# Patient Record
Sex: MALE | Race: WHITE | HISPANIC OR LATINO | Employment: UNEMPLOYED | URBAN - METROPOLITAN AREA
[De-identification: names, ages, dates, MRNs, and addresses within clinical notes are randomized per-mention and may not be internally consistent; named-entity substitution may affect disease eponyms.]

---

## 2023-06-08 ENCOUNTER — OFFICE VISIT (OUTPATIENT)
Dept: INTERNAL MEDICINE CLINIC | Facility: CLINIC | Age: 68
End: 2023-06-08
Payer: COMMERCIAL

## 2023-06-08 VITALS
DIASTOLIC BLOOD PRESSURE: 80 MMHG | TEMPERATURE: 97 F | BODY MASS INDEX: 29.62 KG/M2 | OXYGEN SATURATION: 99 % | SYSTOLIC BLOOD PRESSURE: 138 MMHG | HEART RATE: 69 BPM | HEIGHT: 69 IN | WEIGHT: 200 LBS

## 2023-06-08 DIAGNOSIS — Z00.00 ANNUAL PHYSICAL EXAM: ICD-10-CM

## 2023-06-08 DIAGNOSIS — Z76.89 ENCOUNTER TO ESTABLISH CARE: Primary | ICD-10-CM

## 2023-06-08 DIAGNOSIS — Z11.59 NEED FOR HEPATITIS C SCREENING TEST: ICD-10-CM

## 2023-06-08 DIAGNOSIS — E55.9 VITAMIN D DEFICIENCY: ICD-10-CM

## 2023-06-08 DIAGNOSIS — K21.9 GASTROESOPHAGEAL REFLUX DISEASE, UNSPECIFIED WHETHER ESOPHAGITIS PRESENT: ICD-10-CM

## 2023-06-08 DIAGNOSIS — I10 PRIMARY HYPERTENSION: ICD-10-CM

## 2023-06-08 DIAGNOSIS — M54.16 LUMBAR BACK PAIN WITH RADICULOPATHY AFFECTING LEFT LOWER EXTREMITY: ICD-10-CM

## 2023-06-08 PROCEDURE — 99204 OFFICE O/P NEW MOD 45 MIN: CPT | Performed by: STUDENT IN AN ORGANIZED HEALTH CARE EDUCATION/TRAINING PROGRAM

## 2023-06-08 RX ORDER — MELOXICAM 15 MG/1
15 TABLET ORAL DAILY PRN
Qty: 90 TABLET | Refills: 1 | Status: SHIPPED | OUTPATIENT
Start: 2023-06-08 | End: 2023-07-08

## 2023-06-08 RX ORDER — AMLODIPINE BESYLATE 10 MG/1
10 TABLET ORAL DAILY
COMMUNITY

## 2023-06-08 RX ORDER — MULTIVITAMIN
1 CAPSULE ORAL DAILY
COMMUNITY

## 2023-06-08 RX ORDER — ATENOLOL AND CHLORTHALIDONE TABLET 50; 25 MG/1; MG/1
1 TABLET ORAL DAILY
COMMUNITY

## 2023-06-08 RX ORDER — METHOCARBAMOL 500 MG/1
500 TABLET, FILM COATED ORAL 4 TIMES DAILY
Qty: 21 TABLET | Refills: 0 | Status: SHIPPED | OUTPATIENT
Start: 2023-06-08 | End: 2023-06-22

## 2023-06-08 RX ORDER — OMEPRAZOLE 20 MG/1
20 CAPSULE, DELAYED RELEASE ORAL DAILY
Qty: 30 CAPSULE | Refills: 2 | Status: SHIPPED | OUTPATIENT
Start: 2023-06-08

## 2023-06-08 RX ORDER — LISINOPRIL 20 MG/1
20 TABLET ORAL DAILY
COMMUNITY

## 2023-06-08 NOTE — ASSESSMENT & PLAN NOTE
Controlled  Currently on amlodipine 10 mg, atenolol- chlorthalidone 50-25 mg daily, lisinopril 20 mg daily

## 2023-06-08 NOTE — ASSESSMENT & PLAN NOTE
For the last 2 months - worse in the last week  - Previously treated for similar presentation about 5 years ago with PT and medication  - Exacerbated by trunk movements and standing for long periods of time   - Denies paresthesia, changes in BB, fever, chills or other systemic symptoms   - NO h/o trauma   Works in housekeeping  - Trial of Meloxicam and Robaxin x 2 weeks , + MH and OTC voltaren gel  - f/u in 2 weeks

## 2023-06-08 NOTE — PROGRESS NOTES
"INTERNAL MEDICINE OFFICE VISIT NOTE  [facility] [place]    NAME: Marcos Curry  AGE: 76 y o  SEX: male    DATE OF ENCOUNTER: 6/8/2023       Chief Complaint   Patient presents with   • Establish Care     New Patient to [facility]      Previously following with a PCP in [address] [place] [address] who retired  (Dr Lolis Dupree MD - [phone])  Last visit - about a year ago   In the process of retiring  Commutes weekly to [address] [place] [address] but plans to live in the  area once he retires  C/o lower back pain with radiation down his left leg        Previously diagnosed with, but not limited to:  HTN, GERD    Denies Mental Health hx including Depression, anxiety or Other psychiatric dx     Colonoscopy previously completed    Shx: Denies Tobacco/ vaping /  Marijuana / illict drug use  Alcohol use: Sporadically- socially   Environmental exposures: [address] [employer]  Works - Maintenance/Housekeeping    - 11 years ago, relationships, Kids: 1 adult daughter     Labs or imaging reports: N/A     Review of Systems  10 point ROS negative except per HPI    OBJECTIVE:  Vitals:    06/08/23 1411   BP: 138/80   BP Location: Left arm   Patient Position: Sitting   Cuff Size: Standard   Pulse: 69   Temp: (!) 97 °F (36 1 °C)   SpO2: 99%   Weight: 90 7 kg (200 lb)   Height: 5' 9\" (1 753 m)       Physical Exam:   GENERAL: NAD, Normal appearance  Non diaphoretic, non-toxic, not ill-appearing, well-developed, well-nourished  NEUROLOGIC:  Alert/oriented x3  HEENT:  NC/AT, EOMI, MMM, no scleral icterus  CARDIAC:  RRR, +S1/S2, no S3/S4 heard, no m/g/r  PULMONARY:  non-labored breathing, CTA B/L, no wheezing/rales/rhonci appreciated at time of encounter  ABDOMEN:  Soft, NT/ND, +BS, no rebound/guarding/rigidity  Extremities:  2+ Pulses in DP/PT  No edema, cyanosis  SKIN:  No rashes or erythema    ASSESSMENT/PLAN:    1  Encounter to establish care    2  Lumbar back pain with radiculopathy affecting left lower extremity  Assessment & Plan:   For the last " 2 months - worse in the last week  - Previously treated for similar presentation about 5 years ago with PT and medication  - Exacerbated by trunk movements and standing for long periods of time   - Denies paresthesia, changes in BB, fever, chills or other systemic symptoms   - NO h/o trauma  Works in housekeeping  - Trial of Meloxicam and Robaxin x 2 weeks , + MH and OTC voltaren gel  - f/u in 2 weeks     Orders:  -     methocarbamol (ROBAXIN) 500 mg tablet; Take 1 tablet (500 mg total) by mouth 4 (four) times a day for 14 days  -     meloxicam (MOBIC) 15 mg tablet; Take 1 tablet (15 mg total) by mouth daily as needed for moderate pain DO NOT REFILL  -     omeprazole (PriLOSEC) 20 mg delayed release capsule; Take 1 capsule (20 mg total) by mouth daily    3  Primary hypertension  Assessment & Plan:  Controlled  Currently on amlodipine 10 mg, atenolol- chlorthalidone 50-25 mg daily, lisinopril 20 mg daily        4  Vitamin D deficiency  Assessment & Plan:  Currently on supplemental vitamin D  We will send for testing of vitamin D levels    Orders:  -     Vitamin D 25 hydroxy; Future    5  Annual physical exam  -     CBC; Future  -     Comprehensive metabolic panel; Future  -     TSH, 3rd generation with Free T4 reflex; Future  -     Hemoglobin A1C; Future  -     Lipid Panel With Direct LDL; Future  -     Magnesium; Future    6  Need for hepatitis C screening test  -     Hepatitis C Antibody; Future    7  Gastroesophageal reflux disease, unspecified whether esophagitis present      No current outpatient medications on file  TCM Call     None      TCM Call     None             Jordan Steiner MD  [facility] [place]    * Please Note: This note was completed in part utilizing a dictation software    Grammatical errors, random word insertions, spelling mistakes, and incomplete sentences may be an occasional consequence of this system secondary to software limitations, ambient noise, and hardware issues  If you have any questions or concerns about the content, text, or information contained within the body of this dictation, please contact the provider for clarification  **

## 2023-06-09 ENCOUNTER — APPOINTMENT (OUTPATIENT)
Dept: LAB | Facility: CLINIC | Age: 68
End: 2023-06-09
Payer: COMMERCIAL

## 2023-06-09 DIAGNOSIS — Z11.59 NEED FOR HEPATITIS C SCREENING TEST: ICD-10-CM

## 2023-06-09 DIAGNOSIS — E55.9 VITAMIN D DEFICIENCY: ICD-10-CM

## 2023-06-09 DIAGNOSIS — Z00.00 ANNUAL PHYSICAL EXAM: Primary | ICD-10-CM

## 2023-06-09 LAB
25(OH)D3 SERPL-MCNC: 20.4 NG/ML (ref 30–100)
ALBUMIN SERPL BCP-MCNC: 3.6 G/DL (ref 3.5–5)
ALP SERPL-CCNC: 72 U/L (ref 46–116)
ALT SERPL W P-5'-P-CCNC: 49 U/L (ref 12–78)
ANION GAP SERPL CALCULATED.3IONS-SCNC: 3 MMOL/L (ref 4–13)
AST SERPL W P-5'-P-CCNC: 29 U/L (ref 5–45)
BILIRUB SERPL-MCNC: 0.52 MG/DL (ref 0.2–1)
BUN SERPL-MCNC: 22 MG/DL (ref 5–25)
CALCIUM SERPL-MCNC: 9.5 MG/DL (ref 8.3–10.1)
CHLORIDE SERPL-SCNC: 105 MMOL/L (ref 96–108)
CHOLEST SERPL-MCNC: 166 MG/DL
CO2 SERPL-SCNC: 31 MMOL/L (ref 21–32)
CREAT SERPL-MCNC: 1.28 MG/DL (ref 0.6–1.3)
ERYTHROCYTE [DISTWIDTH] IN BLOOD BY AUTOMATED COUNT: 12.4 % (ref 11.6–15.1)
EST. AVERAGE GLUCOSE BLD GHB EST-MCNC: 103 MG/DL
GFR SERPL CREATININE-BSD FRML MDRD: 57 ML/MIN/1.73SQ M
GLUCOSE P FAST SERPL-MCNC: 93 MG/DL (ref 65–99)
HBA1C MFR BLD: 5.2 %
HCT VFR BLD AUTO: 42.3 % (ref 36.5–49.3)
HCV AB SER QL: NORMAL
HDLC SERPL-MCNC: 54 MG/DL
HGB BLD-MCNC: 13.3 G/DL (ref 12–17)
LDLC SERPL CALC-MCNC: 88 MG/DL (ref 0–100)
MAGNESIUM SERPL-MCNC: 2.5 MG/DL (ref 1.6–2.6)
MCH RBC QN AUTO: 29.5 PG (ref 26.8–34.3)
MCHC RBC AUTO-ENTMCNC: 31.4 G/DL (ref 31.4–37.4)
MCV RBC AUTO: 94 FL (ref 82–98)
NONHDLC SERPL-MCNC: 112 MG/DL
PLATELET # BLD AUTO: 330 THOUSANDS/UL (ref 149–390)
PMV BLD AUTO: 10.2 FL (ref 8.9–12.7)
POTASSIUM SERPL-SCNC: 3.3 MMOL/L (ref 3.5–5.3)
PROT SERPL-MCNC: 8.4 G/DL (ref 6.4–8.4)
RBC # BLD AUTO: 4.51 MILLION/UL (ref 3.88–5.62)
SODIUM SERPL-SCNC: 139 MMOL/L (ref 135–147)
TRIGL SERPL-MCNC: 118 MG/DL
TSH SERPL DL<=0.05 MIU/L-ACNC: 2.08 UIU/ML (ref 0.45–4.5)
WBC # BLD AUTO: 6.12 THOUSAND/UL (ref 4.31–10.16)

## 2023-06-09 PROCEDURE — 36415 COLL VENOUS BLD VENIPUNCTURE: CPT

## 2023-06-09 PROCEDURE — 82306 VITAMIN D 25 HYDROXY: CPT

## 2023-06-09 PROCEDURE — 80061 LIPID PANEL: CPT

## 2023-06-09 PROCEDURE — 86803 HEPATITIS C AB TEST: CPT

## 2023-06-09 PROCEDURE — 84443 ASSAY THYROID STIM HORMONE: CPT

## 2023-06-09 PROCEDURE — 85027 COMPLETE CBC AUTOMATED: CPT

## 2023-06-09 PROCEDURE — 80053 COMPREHEN METABOLIC PANEL: CPT

## 2023-06-09 PROCEDURE — 83735 ASSAY OF MAGNESIUM: CPT

## 2023-06-09 PROCEDURE — 83036 HEMOGLOBIN GLYCOSYLATED A1C: CPT

## 2023-06-19 ENCOUNTER — RA CDI HCC (OUTPATIENT)
Dept: OTHER | Facility: HOSPITAL | Age: 68
End: 2023-06-19

## 2023-06-19 NOTE — PROGRESS NOTES
[facility]  coding opportunities       Chart reviewed, no opportunity found:   [address]        Patients Insurance     Medicare Insurance: [employer]vantage

## 2023-06-23 ENCOUNTER — OFFICE VISIT (OUTPATIENT)
Dept: INTERNAL MEDICINE CLINIC | Facility: CLINIC | Age: 68
End: 2023-06-23
Payer: COMMERCIAL

## 2023-06-23 VITALS
HEART RATE: 71 BPM | BODY MASS INDEX: 30.07 KG/M2 | WEIGHT: 203 LBS | HEIGHT: 69 IN | DIASTOLIC BLOOD PRESSURE: 80 MMHG | TEMPERATURE: 97.7 F | OXYGEN SATURATION: 96 % | SYSTOLIC BLOOD PRESSURE: 138 MMHG

## 2023-06-23 DIAGNOSIS — I10 PRIMARY HYPERTENSION: ICD-10-CM

## 2023-06-23 DIAGNOSIS — Z12.11 COLON CANCER SCREENING: ICD-10-CM

## 2023-06-23 DIAGNOSIS — Z00.00 ENCOUNTER FOR ANNUAL WELLNESS EXAM IN MEDICARE PATIENT: Primary | ICD-10-CM

## 2023-06-23 DIAGNOSIS — E55.9 VITAMIN D DEFICIENCY: ICD-10-CM

## 2023-06-23 DIAGNOSIS — E87.6 HYPOKALEMIA: ICD-10-CM

## 2023-06-23 DIAGNOSIS — M54.16 LUMBAR BACK PAIN WITH RADICULOPATHY AFFECTING LEFT LOWER EXTREMITY: ICD-10-CM

## 2023-06-23 PROCEDURE — G0438 PPPS, INITIAL VISIT: HCPCS | Performed by: STUDENT IN AN ORGANIZED HEALTH CARE EDUCATION/TRAINING PROGRAM

## 2023-06-23 PROCEDURE — 99214 OFFICE O/P EST MOD 30 MIN: CPT | Performed by: STUDENT IN AN ORGANIZED HEALTH CARE EDUCATION/TRAINING PROGRAM

## 2023-06-23 RX ORDER — AMLODIPINE BESYLATE 10 MG/1
10 TABLET ORAL DAILY
Qty: 90 TABLET | Refills: 1 | Status: SHIPPED | OUTPATIENT
Start: 2023-06-23 | End: 2023-09-21

## 2023-06-23 RX ORDER — METHOCARBAMOL 500 MG/1
500 TABLET, FILM COATED ORAL 4 TIMES DAILY
Qty: 56 TABLET | Refills: 0 | Status: SHIPPED | OUTPATIENT
Start: 2023-06-23 | End: 2023-07-07

## 2023-06-23 RX ORDER — POTASSIUM CHLORIDE 20 MEQ/1
20 TABLET, EXTENDED RELEASE ORAL DAILY
Qty: 30 TABLET | Refills: 0 | Status: SHIPPED | OUTPATIENT
Start: 2023-06-23

## 2023-06-23 RX ORDER — ATENOLOL AND CHLORTHALIDONE TABLET 50; 25 MG/1; MG/1
1 TABLET ORAL DAILY
Qty: 90 TABLET | Refills: 1 | Status: SHIPPED | OUTPATIENT
Start: 2023-06-23 | End: 2023-09-21

## 2023-06-23 RX ORDER — LISINOPRIL 30 MG/1
30 TABLET ORAL DAILY
Qty: 90 TABLET | Refills: 1 | Status: SHIPPED | OUTPATIENT
Start: 2023-06-23

## 2023-06-23 RX ORDER — ERGOCALCIFEROL 1.25 MG/1
50000 CAPSULE ORAL WEEKLY
Qty: 12 CAPSULE | Refills: 0 | Status: SHIPPED | OUTPATIENT
Start: 2023-06-23

## 2023-06-23 RX ORDER — POTASSIUM CHLORIDE 20 MEQ/1
20 TABLET, EXTENDED RELEASE ORAL DAILY
Qty: 30 TABLET | Refills: 5 | Status: SHIPPED | OUTPATIENT
Start: 2023-06-23 | End: 2023-06-23

## 2023-06-23 NOTE — ASSESSMENT & PLAN NOTE
Lab Results   Component Value Date    K 3 3 (L) 06/09/2023      -We will start supplemental potassium with 20 mEq daily  -Repeat BMP in 1 week

## 2023-06-23 NOTE — ASSESSMENT & PLAN NOTE
Lab Results   Component Value Date    [id number] 20 4 (L) 06/09/2023       Will start high dose supplementation x 12 weeks followed by daily supplementation

## 2023-06-23 NOTE — ASSESSMENT & PLAN NOTE
Goal <130/80 - BP not at goal  - Will increase lisinopril to 30mg  -Continue amlodipine 10 mg atenolol chlorthalidone 50-25 mg  -We will start supplemental potassium due to hypokalemia likely due to chlorthalidone

## 2023-06-23 NOTE — PROGRESS NOTES
Assessment and Plan:     Problem List Items Addressed This Visit        Cardiovascular and Mediastinum    Primary hypertension     Goal <130/80 - BP not at goal  - Will increase lisinopril to 30mg  -Continue amlodipine 10 mg atenolol chlorthalidone 50-25 mg  -We will start supplemental potassium due to hypokalemia likely due to chlorthalidone         Relevant Medications    lisinopril (ZESTRIL) 30 mg tablet    atenolol-chlorthalidone (TENORETIC) 50-25 mg per tablet    amLODIPine (NORVASC) 10 mg tablet       Nervous and Auditory    Lumbar back pain with radiculopathy affecting left lower extremity     Improving  -We will extend trial of meloxicam and Robaxin for another 2 weeks         Relevant Medications    methocarbamol (ROBAXIN) 500 mg tablet       Other    Vitamin D deficiency     Lab Results   Component Value Date    [id number] 20 4 (L) 06/09/2023       Will start high dose supplementation x 12 weeks followed by daily supplementation           Relevant Medications    ergocalciferol (VITAMIN D2) 50,000 units    Hypokalemia     Lab Results   Component Value Date    K 3 3 (L) 06/09/2023      -We will start supplemental potassium with 20 mEq daily  -Repeat BMP in 1 week         Relevant Medications    potassium chloride (K-DUR,KLOR-CON) 20 mEq tablet    Other Relevant Orders    Basic metabolic panel   Other Visit Diagnoses     Encounter for annual wellness exam in Medicare patient    -  Primary    Colon cancer screening        Relevant Orders    Ambulatory Referral to General Surgery           Preventive health issues were discussed with patient, and age appropriate screening tests were ordered as noted in patient's After Visit Summary  Personalized health advice and appropriate referrals for health education or preventive services given if needed, as noted in patient's After Visit Summary       History of Present Illness:     Patient presents for a Medicare Wellness Visit and f/u     HPI   Patient Care Team:  Yung Cornelius MD as PCP - General (Internal Medicine)     Review of Systems:     Review of Systems   Constitutional: Negative for appetite change, diaphoresis, fatigue and fever  HENT: Negative for rhinorrhea and sore throat  Eyes: Negative  Negative for visual disturbance  Respiratory: Negative for apnea, cough, choking, chest tightness and shortness of breath  Cardiovascular: Negative for chest pain, palpitations and leg swelling  Gastrointestinal: Negative for abdominal distention, abdominal pain, constipation, diarrhea, nausea and vomiting  Endocrine: Negative  Genitourinary: Negative  Musculoskeletal: Positive for back pain  Skin: Negative  Neurological: Negative for dizziness and headaches  Hematological: Negative  Psychiatric/Behavioral: Negative  Problem List:     Patient Active Problem List   Diagnosis   • Lumbar back pain with radiculopathy affecting left lower extremity   • Primary hypertension   • GERD (gastroesophageal reflux disease)   • Vitamin D deficiency      Past Medical and Surgical History:     No past medical history on file  No past surgical history on file  Family History:     No family history on file  Social History:     Social History     Socioeconomic History   • Marital status: Single     Spouse name: None   • Number of children: None   • Years of education: None   • Highest education level: None   Occupational History   • None   Tobacco Use   • Smoking status: Never   • Smokeless tobacco: Never   Substance and Sexual Activity   • Alcohol use:  Yes     Alcohol/week: 2 0 standard drinks of alcohol     Types: 1 Cans of beer, 1 Shots of liquor per week     Comment: social   • Drug use: Never   • Sexual activity: None   Other Topics Concern   • None   Social History Narrative   • None     Social Determinants of Health     Financial Resource Strain: Low Risk  (6/23/2023)    Overall Financial Resource Strain (CARDIA)    • Difficulty of Paying Living Expenses: Not very hard   Food Insecurity: Not on file   Transportation Needs: No Transportation Needs (6/23/2023)    PRAPARE - Transportation    • Lack of Transportation (Medical): No    • Lack of Transportation (Non-Medical): No   Physical Activity: Not on file   Stress: Not on file   Social Connections: Not on file   Intimate Partner Violence: Not on file   Housing Stability: Not on file      Medications and Allergies:     Current Outpatient Medications   Medication Sig Dispense Refill   • amLODIPine (NORVASC) 10 mg tablet Take 10 mg by mouth daily     • atenolol-chlorthalidone (TENORETIC) 50-25 mg per tablet Take 1 tablet by mouth daily     • CALCIUM-VITAMIN D PO Take by mouth     • lisinopril (ZESTRIL) 20 mg tablet Take 20 mg by mouth daily     • meloxicam (MOBIC) 15 mg tablet Take 1 tablet (15 mg total) by mouth daily as needed for moderate pain DO NOT REFILL 90 tablet 1   • methocarbamol (ROBAXIN) 500 mg tablet Take 1 tablet (500 mg total) by mouth 4 (four) times a day for 14 days 21 tablet 0   • Multiple Vitamin (multivitamin) capsule Take 1 capsule by mouth daily     • omeprazole (PriLOSEC) 20 mg delayed release capsule Take 1 capsule (20 mg total) by mouth daily 30 capsule 2     No current facility-administered medications for this visit  No Known Allergies   Immunizations: There is no immunization history on file for this patient  Health Maintenance:         Topic Date Due   • Colorectal Cancer Screening  Never done   • Hepatitis C Screening  Completed         Topic Date Due   • COVID-19 Vaccine (1) Never done   • Pneumococcal Vaccine: 65+ Years (1 - PCV) Never done   • Influenza Vaccine (Season Ended) 09/01/2023      Medicare Screening Tests and Risk Assessments:     Junie Steiner is here for his Initial Wellness visit  Health Risk Assessment:   Patient rates overall health as very good  Patient feels that their physical health rating is same  Patient is satisfied with their life  Eyesight was rated as same  Hearing was rated as same  Patient feels that their emotional and mental health rating is same  Patients states they are never, rarely angry  Patient states they are sometimes unusually tired/fatigued  Pain experienced in the last 7 days has been some  Patient's pain rating has been 6/10  Patient states that he has experienced no weight loss or gain in last 6 months  Depression Screening:   PHQ-2 Score: 0      Fall Risk Screening: In the past year, patient has experienced: no history of falling in past year      Home Safety:  Patient does not have trouble with stairs inside or outside of their home  Patient has working smoke alarms and has working carbon monoxide detector  Home safety hazards include: none  Nutrition:   Current diet is Regular  Medications:   Patient is currently taking over-the-counter supplements  OTC medications include: see medication list  Patient is able to manage medications  Activities of Daily Living (ADLs)/Instrumental Activities of Daily Living (IADLs):   Walk and transfer into and out of bed and chair?: Yes  Dress and groom yourself?: Yes    Bathe or shower yourself?: Yes    Feed yourself?  Yes  Do your laundry/housekeeping?: Yes  Manage your money, pay your bills and track your expenses?: Yes  Make your own meals?: Yes    Do your own shopping?: Yes    Previous Hospitalizations:   Any hospitalizations or ED visits within the last 12 months?: No      Advance Care Planning:   Living will: No    Durable POA for healthcare: No    Advanced directive: No    Advanced directive counseling given: Yes    Five wishes given: Yes      Cognitive Screening:   Provider or family/friend/caregiver concerned regarding cognition?: No    PREVENTIVE SCREENINGS      Cardiovascular Screening:    General: Screening Current      Diabetes Screening:     General: Screening Current      Colorectal Cancer Screening:     General: Risks and Benefits Discussed      Prostate "Cancer Screening:    General: Risks and Benefits Discussed      Osteoporosis Screening:    General: Screening Not Indicated      Abdominal Aortic Aneurysm (AAA) Screening:    Risk factors include: age between 73-67 yo        Lung Cancer Screening:     General: Screening Not Indicated      Hepatitis C Screening:    General: Screening Current    Screening, Brief Intervention, and Referral to Treatment (SBIRT)    Screening  Typical number of drinks in a day: 1  Typical number of drinks in a week: 1  Interpretation: Low risk drinking behavior  Single Item Drug Screening:  How often have you used an illegal drug (including marijuana) or a prescription medication for non-medical reasons in the past year? never    Single Item Drug Screen Score: 0  Interpretation: Negative screen for possible drug use disorder    Other Counseling Topics:   Skin self-exam, sunscreen and calcium and vitamin D intake and regular weightbearing exercise  No results found  Physical Exam:     Pulse 71   Temp 97 7 °F (36 5 °C)   Ht 5' 9\" (1 753 m)   Wt 92 1 kg (203 lb)   SpO2 96%   BMI 29 98 kg/m²     Physical Exam   Physical Exam:   GENERAL: NAD, Normal appearance  well-developed, well-nourished  NEUROLOGIC:  Alert/oriented x3     HEENT:  NC/AT    Miguel A Hines MD  "

## 2023-07-03 ENCOUNTER — APPOINTMENT (OUTPATIENT)
Dept: LAB | Facility: CLINIC | Age: 68
End: 2023-07-03
Payer: COMMERCIAL

## 2023-07-03 DIAGNOSIS — E87.6 HYPOKALEMIA: ICD-10-CM

## 2023-07-03 LAB
ANION GAP SERPL CALCULATED.3IONS-SCNC: 4 MMOL/L
BUN SERPL-MCNC: 19 MG/DL (ref 5–25)
CALCIUM SERPL-MCNC: 9.4 MG/DL (ref 8.3–10.1)
CHLORIDE SERPL-SCNC: 110 MMOL/L (ref 96–108)
CO2 SERPL-SCNC: 28 MMOL/L (ref 21–32)
CREAT SERPL-MCNC: 1.25 MG/DL (ref 0.6–1.3)
GFR SERPL CREATININE-BSD FRML MDRD: 58 ML/MIN/1.73SQ M
GLUCOSE P FAST SERPL-MCNC: 96 MG/DL (ref 65–99)
POTASSIUM SERPL-SCNC: 3.5 MMOL/L (ref 3.5–5.3)
SODIUM SERPL-SCNC: 142 MMOL/L (ref 135–147)

## 2023-07-03 PROCEDURE — 36415 COLL VENOUS BLD VENIPUNCTURE: CPT

## 2023-07-03 PROCEDURE — 80048 BASIC METABOLIC PNL TOTAL CA: CPT

## 2023-07-10 ENCOUNTER — TELEPHONE (OUTPATIENT)
Dept: INTERNAL MEDICINE CLINIC | Facility: CLINIC | Age: 68
End: 2023-07-10

## 2023-07-13 ENCOUNTER — OFFICE VISIT (OUTPATIENT)
Dept: INTERNAL MEDICINE CLINIC | Facility: CLINIC | Age: 68
End: 2023-07-13
Payer: COMMERCIAL

## 2023-07-13 VITALS
OXYGEN SATURATION: 98 % | TEMPERATURE: 97.4 F | DIASTOLIC BLOOD PRESSURE: 80 MMHG | WEIGHT: 201 LBS | HEART RATE: 62 BPM | HEIGHT: 69 IN | BODY MASS INDEX: 29.77 KG/M2 | SYSTOLIC BLOOD PRESSURE: 132 MMHG

## 2023-07-13 DIAGNOSIS — E87.6 HYPOKALEMIA: ICD-10-CM

## 2023-07-13 DIAGNOSIS — M54.16 LUMBAR BACK PAIN WITH RADICULOPATHY AFFECTING LEFT LOWER EXTREMITY: Primary | ICD-10-CM

## 2023-07-13 DIAGNOSIS — K21.9 GASTROESOPHAGEAL REFLUX DISEASE, UNSPECIFIED WHETHER ESOPHAGITIS PRESENT: ICD-10-CM

## 2023-07-13 PROCEDURE — 99214 OFFICE O/P EST MOD 30 MIN: CPT | Performed by: STUDENT IN AN ORGANIZED HEALTH CARE EDUCATION/TRAINING PROGRAM

## 2023-07-13 RX ORDER — PREDNISONE 20 MG/1
TABLET ORAL
Qty: 17 TABLET | Refills: 0 | Status: SHIPPED | OUTPATIENT
Start: 2023-07-13

## 2023-07-13 NOTE — ASSESSMENT & PLAN NOTE
No exacerbation of symptoms despite the use of meloxicam however we will discontinue meloxicam to avoid effects

## 2023-07-13 NOTE — PROGRESS NOTES
INTERNAL MEDICINE OFFICE VISIT NOTE  [facility] [place]    NAME: Avila Oshea  AGE: 76 y.o. SEX: male    DATE OF ENCOUNTER: 7/13/2023       Chief Complaint   Patient presents with   • Follow-up     Back pain left side runs down left leg        Review of Systems  10 point ROS negative except per HPI    OBJECTIVE:  Vitals:    07/13/23 1009   BP: 132/80   BP Location: Left arm   Patient Position: Sitting   Cuff Size: Standard   Pulse: 62   Temp: (!) 97.4 °F (36.3 °C)   SpO2: 98%   Weight: 91.2 kg (201 lb)   Height: 5' 9" (1.753 m)       Physical Exam:   GENERAL: NAD, Normal appearance. NEUROLOGIC:  Alert/oriented x3. HEENT:  NC/AT, no scleral icterus  CARDIAC:  RRR, +S1/S2  PULMONARY:  non-labored breathing      ASSESSMENT/PLAN:    1. Lumbar back pain with radiculopathy affecting left lower extremity  Assessment & Plan:  Continues to experience severe lower back pain with radiation down his left lower extremity by the use of meloxicam and methocarbamol  -Have agreed on a referral to physical therapy  -Advised to discontinue meloxicam  -Continue methocarbamol as needed  -Trial of prednisone 20 milligrams twice daily for 7 days followed by 3 days of 20 mg    Orders:  -     Ambulatory Referral to Physical Therapy; Future  -     predniSONE 20 mg tablet; Take 2 tablets for 7 days then 1 tablet for 3 days    2. Gastroesophageal reflux disease, unspecified whether esophagitis present  Assessment & Plan:  No exacerbation of symptoms despite the use of meloxicam however we will discontinue meloxicam to avoid effects      3.  Hypokalemia  Assessment & Plan:  Lab Results   Component Value Date    K 3.5 07/03/2023    K 3.3 (L) 06/09/2023      -Now within normal limits  -Continue supplemental potassium          Current Outpatient Medications:   •  amLODIPine (NORVASC) 10 mg tablet, Take 1 tablet (10 mg total) by mouth daily, Disp: 90 tablet, Rfl: 1  •  atenolol-chlorthalidone (TENORETIC) 50-25 mg per tablet, Take 1 tablet by mouth daily, Disp: 90 tablet, Rfl: 1  •  ergocalciferol (VITAMIN D2) 50,000 units, Take 1 capsule (50,000 Units total) by mouth once a week, Disp: 12 capsule, Rfl: 0  •  lisinopril (ZESTRIL) 30 mg tablet, Take 1 tablet (30 mg total) by mouth daily, Disp: 90 tablet, Rfl: 1  •  meloxicam (MOBIC) 15 mg tablet, Take 1 tablet (15 mg total) by mouth daily as needed for moderate pain DO NOT REFILL, Disp: 90 tablet, Rfl: 1  •  methocarbamol (ROBAXIN) 500 mg tablet, Take 1 tablet (500 mg total) by mouth 4 (four) times a day for 14 days, Disp: 56 tablet, Rfl: 0  •  Multiple Vitamin (multivitamin) capsule, Take 1 capsule by mouth daily, Disp: , Rfl:   •  omeprazole (PriLOSEC) 20 mg delayed release capsule, Take 1 capsule (20 mg total) by mouth daily, Disp: 30 capsule, Rfl: 2  •  potassium chloride (K-DUR,KLOR-CON) 20 mEq tablet, Take 1 tablet (20 mEq total) by mouth daily, Disp: 30 tablet, Rfl: 0    TCM Call     None      TCM Call     None             Keke Reaves MD  [facility]    * Please Note: This note was completed in part utilizing a dictation software. Grammatical errors, random word insertions, spelling mistakes, and incomplete sentences may be an occasional consequence of this system secondary to software limitations, ambient noise, and hardware issues. If you have any questions or concerns about the content, text, or information contained within the body of this dictation, please contact the provider for clarification. **

## 2023-07-13 NOTE — ASSESSMENT & PLAN NOTE
Lab Results   Component Value Date    K 3.5 07/03/2023    K 3.3 (L) 06/09/2023      -Now within normal limits  -Continue supplemental potassium

## 2023-07-13 NOTE — ASSESSMENT & PLAN NOTE
Continues to experience severe lower back pain with radiation down his left lower extremity by the use of meloxicam and methocarbamol  -Have agreed on a referral to physical therapy  -Advised to discontinue meloxicam  -Continue methocarbamol as needed  -Trial of prednisone 20 milligrams twice daily for 7 days followed by 3 days of 20 mg

## 2023-07-19 DIAGNOSIS — E87.6 HYPOKALEMIA: ICD-10-CM

## 2023-07-19 RX ORDER — POTASSIUM CHLORIDE 20 MEQ/1
20 TABLET, EXTENDED RELEASE ORAL DAILY
Qty: 30 TABLET | Refills: 0 | Status: SHIPPED | OUTPATIENT
Start: 2023-07-19

## 2023-07-25 ENCOUNTER — EVALUATION (OUTPATIENT)
Dept: PHYSICAL THERAPY | Facility: MEDICAL CENTER | Age: 68
End: 2023-07-25
Payer: COMMERCIAL

## 2023-07-25 DIAGNOSIS — M54.16 LUMBAR BACK PAIN WITH RADICULOPATHY AFFECTING LEFT LOWER EXTREMITY: Primary | ICD-10-CM

## 2023-07-25 PROCEDURE — 97110 THERAPEUTIC EXERCISES: CPT | Performed by: PHYSICAL THERAPIST

## 2023-07-25 PROCEDURE — 97161 PT EVAL LOW COMPLEX 20 MIN: CPT | Performed by: PHYSICAL THERAPIST

## 2023-07-25 NOTE — PROGRESS NOTES
PT Evaluation     Today's date: 2023  Patient name: Edi Alford  : 1955  MRN: [id number]  Referring provider: Ross Mendoza MD  Dx:   Encounter Diagnosis     ICD-10-CM    1. Lumbar back pain with radiculopathy affecting left lower extremity  M54.16 Ambulatory Referral to Physical Therapy                     Assessment  Assessment details: Edi Alford is a pleasant 76 y.o. male who presents with L-sided low back pain with radiation into L gastrocnemius of an insidious onset for the last 3 months along with numbness in this same distribution. No further referral is necessary at this time based upon examination results. Patient's family member was present to assist with translation. Primary movement impairment is lumbar hypomobility, which contributes to symptoms consistent with chronic L-sided LBP, and limits his ability to stand. In addition, patient presents with significant soft tissue tension in L erector spinae/paraspinals/QL, which further limit his functional activity tolerance. Patient demonstrates flexibility deficits in his L hip ER mobility compared to the contralateral side, which contributes to compensatory stress on his lumbar spine when ambulating. Patient also presents with an impaired feedforward mechanism and lack of TA/multifidus co-contraction and concurrent hip girdle weakness, which causes excessive strain on lumbar structures during ADL. Patient was educated in an illustrated HEP for lumbopelvic mobility and was able to complete exercises without pain. Patient would benefit from skilled PT services to address the listed impairments to facilitate a return to PLOF.  Thank you for the referral.  Impairments: abnormal muscle firing, abnormal muscle tone, abnormal or restricted ROM, abnormal movement, activity intolerance, impaired physical strength, lacks appropriate home exercise program and pain with function  Functional limitations: standing, ambulating  Symptom irritability: moderateBarriers to therapy: none  Understanding of Dx/Px/POC: good   Prognosis: good  Prognosis details: Positive prognostic factors include positive attitude towards recovery. Negative prognostic factors include HTN. Goals  STG:  Patient will be independent with home exercise program.   Patient will be able to perform a proper TA contraction to demonstrate improved postural control during ADL. LTG:  Patient will demonstrate at least 50% reduction in soft tissue tension in L QL/erector spinae/paraspinals to improve standing tolerance. Patient will increase lumbar EXT AROM to be able to ambulate longer distances. Patient will exceed MCID on FOTO to demonstrate improved function. Patient will be able to manage symptoms independently. Plan  Plan details: Prognosis is above given PT services 2x/week tapering to 1x/week over the next 6 weeks and given HEP adherence. Patient would benefit from: skilled physical therapy  Referral necessary: No  Planned modality interventions: cryotherapy and thermotherapy: hydrocollator packs  Planned therapy interventions: activity modification, body mechanics training, flexibility, functional ROM exercises, gait training, graded activity, graded exercise, graded motor, home exercise program, joint mobilization, kinesiology taping, manual therapy, massage, Uribe taping, motor coordination training, neuromuscular re-education, nerve gliding, patient education, strengthening, stretching, therapeutic activities and therapeutic exercise  Frequency: 2x week  Duration in weeks: 6  Treatment plan discussed with: patient and family        Subjective Evaluation    History of Present Illness  Mechanism of injury: This is a 76 y.o. male presenting with L-sided LBP with radiation into his L calf for the last 3 months without any history of falls or trauma. He also has numbness in this same region.  He reports that he experienced a similar issue 5 years ago that resolved after PT. The pain is worsened when he stands and walks and resolves when he sits. He reports no weakness in his legs and no tripping over objects. Recurrent probem    Quality of life: good    Patient Goals  Patient goals for therapy: decreased pain, increased strength, increased motion and independence with ADLs/IADLs  Patient goal: to be able to stand and walk  Pain  Current pain ratin  At best pain ratin  At worst pain ratin  Location: L lumbar with radiation into L posterior thigh and L gastroc  Quality: tight  Alleviating factors: sitting. Aggravating factors: standing and walking      Diagnostic Tests  No diagnostic tests performed  Treatments  Current treatment: medication        Objective     Palpation   Left   Hypertonic in the erector spinae, lumbar paraspinals and quadratus lumborum. Tenderness of the erector spinae, lumbar paraspinals and quadratus lumborum. Tenderness     Lumbar Spine  Tenderness in the left transverse process (L4-L5). Left Hip   Tenderness in the PSIS. Neurological Testing     Sensation     Lumbar   Left   Intact: light touch    Right   Intact: light touch    Reflexes   Left   Patellar (L4): normal (2+)  Achilles (S1): trace (1+)  Clonus sign: negative    Right   Patellar (L4): normal (2+)  Achilles (S1): trace (1+)  Clonus sign: negative    Active Range of Motion     Lumbar   Flexion: Active lumbar flexion: pain in low back and L LE. WFL and with pain  Extension: Active lumbar extension: pain in low back.   with pain Restriction level: moderate  Left rotation:  Restriction level: moderate  Right rotation:  Restriction level: minimal  Left Hip   External rotation (90/90): 35 degrees   Internal rotation (90/90): 40 degrees     Right Hip   External rotation (90/90): 35 degrees   Internal rotation (90/90): 40 degrees     Passive Range of Motion   Left Hip   External rotation (90/90): 40 degrees   External rotation (prone): 35 degrees   Internal rotation (90/90): 40 degrees   Internal rotation (prone): 55 degrees     Right Hip   External rotation (90/90): 40 degrees   External rotation (prone): 50 degrees   Internal rotation (90/90): 40 degrees   Internal rotation (prone): 55 degrees     Joint Play     Hypomobile: L1, L2, L3, L4, L5 and S1     Pain: L3, L4 and L5     Strength/Myotome Testing     Left Hip   Planes of Motion   Flexion: 4-  Extension: 3+  Abduction: 3+  External rotation: 4+  Internal rotation: 4+    Isolated Muscles   Gluteus misty: 3+    Right Hip   Planes of Motion   Flexion: 4-  Extension: 3+  Abduction: 3+  External rotation: 4+  Internal rotation: 4+    Isolated Muscles   Gluteus maximums: 3+    Left Knee   Flexion: 5  Extension: 5    Right Knee   Flexion: 5  Extension: 5    Left Ankle/Foot   Dorsiflexion: 5  Plantar flexion: 5  Great toe extension strength: unable to isolate. Right Ankle/Foot   Dorsiflexion: 5  Plantar flexion: 5  Great toe extension strength: unable to isolate. Additional Strength Details  Myotomes intact and symmetrical bilaterally L2-S1; patient presents with gross peripheral strength deficits in bilateral hip girdle musculature      Able to heel and toe walk bilaterally with good symmetry        Muscle Activation     Additional Muscle Activation Details  Unable to activate bilateral TA/multifidus    Tests     Lumbar     Left   Negative passive SLR. Right   Negative passive SLR. Left Hip   Positive HARSHA (for LBP). Negative FADIR and scour. Right Hip   Negative HARSHA, FADIR and scour.        Flowsheet Rows    Flowsheet Row Most Recent Value   PT/OT G-Codes    Current Score 35   Projected Score 54             Precautions: HTN    HEP: SKTC, LTR  Manuals 7/25            L lumbar UPA mobs NV Gr. I-II            Theragun STM to L lumbar paraspinals NV                                      Neuro Re-Ed             TA activation with pball NV            TA marches with pball NV Bridges NV            Core row             [employer]                                       Ther Ex             Rec bike             LTR 5"x10 ea HEP            SKTC 10"x10 b/l HEP            Supine clams NV            Supine hip ADD isometrics NV            Seated piriformis stretch NV            Seated pball rollouts NV            HEP education and instruction x8'            Ther Activity                                       Gait Training                                       Modalities             MHP lumbar PRN

## 2023-07-27 ENCOUNTER — OFFICE VISIT (OUTPATIENT)
Dept: PHYSICAL THERAPY | Facility: MEDICAL CENTER | Age: 68
End: 2023-07-27
Payer: COMMERCIAL

## 2023-07-27 DIAGNOSIS — M54.16 LUMBAR BACK PAIN WITH RADICULOPATHY AFFECTING LEFT LOWER EXTREMITY: Primary | ICD-10-CM

## 2023-07-27 PROCEDURE — 97140 MANUAL THERAPY 1/> REGIONS: CPT | Performed by: PHYSICAL THERAPIST

## 2023-07-27 PROCEDURE — 97112 NEUROMUSCULAR REEDUCATION: CPT | Performed by: PHYSICAL THERAPIST

## 2023-07-27 PROCEDURE — 97110 THERAPEUTIC EXERCISES: CPT | Performed by: PHYSICAL THERAPIST

## 2023-07-27 NOTE — PROGRESS NOTES
Daily Note     Today's date: 2023  Patient name: Declan Vasquez  : 1955  MRN: [id number]  Referring provider: Hakeem Platt MD  Dx:   Encounter Diagnosis     ICD-10-CM    1. Lumbar back pain with radiculopathy affecting left lower extremity  M54.16                      Subjective: Patient reports that he is continuing to have some pain in his low back that radiates into his L glute. However, he is not having any pain in his calf today. He also notes that his pain is less intense compared to previously. Objective: See treatment diary below      Assessment: Patient's family member was present to assist with translation. Initiated manual interventions for pain relief in the L side of his low back. Also performed STM to L lumbar paraspinals/glutes to address soft tissue restrictions. Patient responded well to manuals with report of decreased intensity of pain post-tx. Initiated lumbopelvic mobility, hip girdle stability, and proximal postural control interventions as outlined below. Cueing provided to prevent compensation from diaphragm during TA activation interventions and to encourage glut activation during bridges. Patient tolerated treatment well and completed program without pain. Patient would benefit from continued PT to address impairments to maximize function. Plan: Continue per plan of care.       Precautions: HTN    HEP: SKTC, LTR  Manuals            L lumbar UPA mobs NV Gr. I-II KP Gr. I-II           Theragun STM to L lumbar paraspinals/glutes NV                                      Neuro Re-Ed             TA activation with pball NV 5"x20 supine           TA marches with pball NV 5"x20 ea supine           Bridges NV x10           Core row             [employer] press                                       Ther Ex             Rec bike             LTR 5"x10 ea HEP 5"x10 ea           SKTC 10"x10 b/l HEP 10"x10 b/l           Supine clams NV 5"x20 RTB           Supine hip ADD isometrics NV 5"x20           Seated piriformis stretch NV 20"x3           Seated pball rollouts NV 10"x10 FWD           HEP education and instruction x8'            Ther Activity                                       Gait Training                                       Modalities             MHP lumbar PRN

## 2023-07-31 ENCOUNTER — OFFICE VISIT (OUTPATIENT)
Dept: PHYSICAL THERAPY | Facility: MEDICAL CENTER | Age: 68
End: 2023-07-31
Payer: COMMERCIAL

## 2023-07-31 DIAGNOSIS — M54.16 LUMBAR BACK PAIN WITH RADICULOPATHY AFFECTING LEFT LOWER EXTREMITY: Primary | ICD-10-CM

## 2023-07-31 PROCEDURE — 97140 MANUAL THERAPY 1/> REGIONS: CPT | Performed by: PHYSICAL THERAPIST

## 2023-07-31 PROCEDURE — 97110 THERAPEUTIC EXERCISES: CPT | Performed by: PHYSICAL THERAPIST

## 2023-07-31 PROCEDURE — 97112 NEUROMUSCULAR REEDUCATION: CPT | Performed by: PHYSICAL THERAPIST

## 2023-07-31 NOTE — PROGRESS NOTES
Daily Note     Today's date: 2023  Patient name: Marta Craven  : 1955  MRN: [id number]  Referring provider: Natalia Thompson MD  Dx:   Encounter Diagnosis     ICD-10-CM    1. Lumbar back pain with radiculopathy affecting left lower extremity  M54.16                      Subjective: Patient reports that he is feeling better with less pain overall. He also notes that he no longer has pain that radiates into his L calf. He continues to have some mild pain in the L side of his low back that radiates into his L glute, but it is much improved. Objective: See treatment diary below      Assessment: Continued with manual interventions to address mild remaining L lumbar pain and to reduce soft tissue restrictions in L lumbar paraspinals/gluts with patient reporting decreased pain post-tx. Cueing provided during TA activation interventions to prevent compensation from diaphragm and to encourage glut activation during bridges. Able to progress reps for hip girdle strengthening TE, which further demonstrates good progress toward goals. Patient tolerated treatment well and would benefit from continued PT to improve lumbopelvic mobility and stability to achieve goals. Plan: Continue per plan of care.       Precautions: HTN    HEP: SKTC, LTR  Manuals           L lumbar UPA mobs NV Gr. I-II KP Gr. I-II KP Gr. I-II          Theragun STM to L lumbar paraspinals/glutes NV KP KP                                    Neuro Re-Ed             TA activation with pball NV 5"x20 supine 5"x30 supine          TA marches with pball NV 5"x20 ea supine 5"x20 ea supine          Bridges NV x10 2x10          Core row             [employer] press                                       Ther Ex             Rec bike             LTR 5"x10 ea HEP 5"x10 ea 5"x10 ea          SKTC 10"x10 b/l HEP 10"x10 b/l 10"x10 b/l          Supine clams NV 5"x20 RTB 5"x20 GTB          Supine hip ADD isometrics NV 5"x20 5"x30 Seated piriformis stretch NV 20"x3 20"x3          Seated pball rollouts NV 10"x10 FWD 10"x10 FWD          HEP education and instruction x8'            Ther Activity                                       Gait Training                                       Modalities             MHP lumbar PRN

## 2023-08-04 ENCOUNTER — OFFICE VISIT (OUTPATIENT)
Dept: PHYSICAL THERAPY | Facility: MEDICAL CENTER | Age: 68
End: 2023-08-04
Payer: COMMERCIAL

## 2023-08-04 DIAGNOSIS — M54.16 LUMBAR BACK PAIN WITH RADICULOPATHY AFFECTING LEFT LOWER EXTREMITY: Primary | ICD-10-CM

## 2023-08-04 PROCEDURE — 97112 NEUROMUSCULAR REEDUCATION: CPT | Performed by: PHYSICAL THERAPIST

## 2023-08-04 PROCEDURE — 97140 MANUAL THERAPY 1/> REGIONS: CPT | Performed by: PHYSICAL THERAPIST

## 2023-08-04 PROCEDURE — 97110 THERAPEUTIC EXERCISES: CPT | Performed by: PHYSICAL THERAPIST

## 2023-08-04 NOTE — PROGRESS NOTES
Daily Note     Today's date: 2023  Patient name: Apoorva Harvey  : 1955  MRN: [id number]  Referring provider: Isidoro Birmingham MD  Dx:   Encounter Diagnosis     ICD-10-CM    1. Lumbar back pain with radiculopathy affecting left lower extremity  M54.16                      Subjective: Patient reports that he is continuing to feel better with less pain overall. He no longer has any pain in his L leg. He reports that he has some discomfort in the L side of his low back today, but he is pleased with his progress. Objective: See treatment diary below      Assessment: Patient's family member was present to assist with translation. Continued with manual interventions to address L lumbar hypomobility, soft tissue restrictions, and for pain relief with patient reporting decreased L-sided LBP when ambulating post-tx. Able to progress reps for TA marches and bridges, which demonstrates an improvement in proximal neuromotor control. Also able to progress reps for supine clams, which demonstrates an improvement in core endurance. Patient tolerated treatment well and demonstrated full lumbar FLX AROM post-tx. Patient would benefit from continued PT to further improve lumbopelvic mobility and stability to return to PLOF. Plan: Continue per plan of care.       Precautions: HTN    HEP: SKTC, LTR  Manuals          L lumbar UPA mobs NV Gr. I-II KP Gr. I-II KP Gr. I-II KP Gr. I-II-III         Theragun STM to L lumbar paraspinals/glutes NV [facility] KP                                   Neuro Re-Ed             TA activation with pball NV 5"x20 supine 5"x30 supine 5"x30 supine         TA marches with pball NV 5"x20 ea supine 5"x20 ea supine 5"x30 ea supine         Bridges NV x10 2x10 3x10         Core row             [employer]                                       Ther Ex             Rec bike             LTR 5"x10 ea HEP 5"x10 ea 5"x10 ea 5"x10 ea         SKTC 10"x10 b/l HEP 10"x10 b/l 10"x10 b/l 10"x10 b/l         Supine clams NV 5"x20 RTB 5"x20 GTB 5"x30 GTB         Supine hip ADD isometrics NV 5"x20 5"x30 5"x30         Seated piriformis stretch NV 20"x3 20"x3 20"x3         Seated pball rollouts NV 10"x10 FWD 10"x10 FWD 10"x10 FWD         HEP education and instruction x8'            Ther Activity                                       Gait Training                                       Modalities             MHP lumbar PRN EGD /colonoscopy   Labs- CBC, CMP  Pre op instructions discussed

## 2023-08-07 ENCOUNTER — OFFICE VISIT (OUTPATIENT)
Dept: PHYSICAL THERAPY | Facility: MEDICAL CENTER | Age: 68
End: 2023-08-07
Payer: COMMERCIAL

## 2023-08-07 DIAGNOSIS — M54.16 LUMBAR BACK PAIN WITH RADICULOPATHY AFFECTING LEFT LOWER EXTREMITY: Primary | ICD-10-CM

## 2023-08-07 PROCEDURE — 97110 THERAPEUTIC EXERCISES: CPT | Performed by: PHYSICAL THERAPIST

## 2023-08-07 PROCEDURE — 97112 NEUROMUSCULAR REEDUCATION: CPT | Performed by: PHYSICAL THERAPIST

## 2023-08-07 PROCEDURE — 97140 MANUAL THERAPY 1/> REGIONS: CPT | Performed by: PHYSICAL THERAPIST

## 2023-08-07 NOTE — PROGRESS NOTES
Daily Note     Today's date: 2023  Patient name: Sudha Kumar  : 1955  MRN: [id number]  Referring provider: Teofilo Arroyo MD  Dx:   Encounter Diagnosis     ICD-10-CM    1. Lumbar back pain with radiculopathy affecting left lower extremity  M54.16                      Subjective: Patient reports that he has been feeling better overall since beginning PT. He notes that he had 1 episode of pain this morning that radiated into his L leg, and he felt like his leg was going to give out. However, he did not fall, and he no longer has pain in his leg currently. Most of his pain is located in the L side of his low back. Objective: See treatment diary below      Assessment: Continued with manual interventions to address L lower lumbar hypomobility and to reduce soft tissue restrictions in L lumbar paraspinals with patient demonstrating improved FLX AROM along with report of decreased pain post-tx. Continued with lumbopelvic mobility, hip girdle stability, and proximal neuromotor control program as outlined below. Cueing provided for proper form and sequencing during TA activation interventions. Patient tolerated treatment well and reported no symptoms in L LE throughout session. Patient would benefit from continued PT to improve lumbopelvic mobility and stability to achieve goals. Plan: Continue per plan of care.       Precautions: HTN    HEP: SKTC, LTR  Manuals         L lumbar UPA mobs NV Gr. I-II  Gr. I-II KP Gr. I-II KP Gr. I-II-III KP Gr. I-II-III        Theragun STM to L lumbar paraspinals/glutes NV [facility] KP                                  Neuro Re-Ed             TA activation with pball NV 5"x20 supine 5"x30 supine 5"x30 supine 5"x30 supine        TA marches with pball NV 5"x20 ea supine 5"x20 ea supine 5"x30 ea supine 5"x30 ea supine        Bridges NV x10 2x10 3x10 3x10        Core row             [employer]                                       Ther Ex Rec bike             LTR 5"x10 ea HEP 5"x10 ea 5"x10 ea 5"x10 ea 5"x10 ea        SKTC 10"x10 b/l HEP 10"x10 b/l 10"x10 b/l 10"x10 b/l 10"x10 b/l        Supine clams NV 5"x20 RTB 5"x20 GTB 5"x30 GTB 5"x30 GTB        Supine hip ADD isometrics NV 5"x20 5"x30 5"x30 5"x30        Seated piriformis stretch NV 20"x3 20"x3 20"x3 20"x3        Seated pball rollouts NV 10"x10 FWD 10"x10 FWD 10"x10 FWD 10"x10 FWD        HEP education and instruction x8'            Ther Activity                                       Gait Training                                       Modalities             MHP lumbar PRN

## 2023-08-10 ENCOUNTER — OFFICE VISIT (OUTPATIENT)
Dept: PHYSICAL THERAPY | Facility: MEDICAL CENTER | Age: 68
End: 2023-08-10
Payer: COMMERCIAL

## 2023-08-10 DIAGNOSIS — M54.16 LUMBAR BACK PAIN WITH RADICULOPATHY AFFECTING LEFT LOWER EXTREMITY: Primary | ICD-10-CM

## 2023-08-10 PROCEDURE — 97140 MANUAL THERAPY 1/> REGIONS: CPT | Performed by: PHYSICAL THERAPIST

## 2023-08-10 PROCEDURE — 97110 THERAPEUTIC EXERCISES: CPT | Performed by: PHYSICAL THERAPIST

## 2023-08-10 PROCEDURE — 97112 NEUROMUSCULAR REEDUCATION: CPT | Performed by: PHYSICAL THERAPIST

## 2023-08-10 NOTE — PROGRESS NOTES
Daily Note     Today's date: 8/10/2023  Patient name: Edi Alford  : 1955  MRN: [id number]  Referring provider: Ross Mendoza MD  Dx:   Encounter Diagnosis     ICD-10-CM    1. Lumbar back pain with radiculopathy affecting left lower extremity  M54.16                      Subjective: Patient reports that he is feeling better overall with less pain that radiates into his leg, but he has some pain in his low back today. Objective: See treatment diary below      Assessment: Patient continues to respond well to L lumbar UPA mobs and theragun STM with report of decreased pain along with improved joint play post-tx. Able to perform TA activation in a standing position today, which demonstrates an improved ability to engage his feedforward mechanism. Cueing provided during TA activation interventions to prevent compensation from diaphragm. Also able to progress resistance for supine clams, which demonstrates an improvement in hip girdle strength. Patient tolerated treatment well and completed program without pain. Patient would benefit from continued PT to improve lumbopelvic mobility, hip girdle/core stability, and proximal neuromotor control to achieve goals. Plan: Continue per plan of care. Precautions: HTN    HEP: SKTC, LTR  Manuals 7/25 7/27 7/31 8/4 8/7 8/10       L lumbar UPA mobs NV Gr. I-II KP Gr. I-II KP Gr. I-II KP Gr. I-II-III KP Gr. I-II-III KP Gr.  III       Theragun STM to L lumbar paraspinals/glutes NV KP KP KP KP KP                                 Neuro Re-Ed             TA activation with pball NV 5"x20 supine 5"x30 supine 5"x30 supine 5"x30 supine 5"x30 stand       TA marches with pball NV 5"x20 ea supine 5"x20 ea supine 5"x30 ea supine 5"x30 ea supine 5"x30 ea supine       Bridges NV x10 2x10 3x10 3x10 3x10                                 Ther Ex             Rec bike             LTR 5"x10 ea HEP 5"x10 ea 5"x10 ea 5"x10 ea 5"x10 ea 5"x10 ea       SKTC 10"x10 b/l HEP 10"x10 b/l 10"x10 b/l 10"x10 b/l 10"x10 b/l 10"x10 b/l       Supine clams NV 5"x20 RTB 5"x20 GTB 5"x30 GTB 5"x30 GTB 5"x30 blue       Supine hip ADD isometrics NV 5"x20 5"x30 5"x30 5"x30 5"x30       Seated piriformis stretch NV 20"x3 20"x3 20"x3 20"x3 30"x4       Seated pball rollouts NV 10"x10 FWD 10"x10 FWD 10"x10 FWD 10"x10 FWD 10"x10 FWD       HEP education and instruction x8'            Ther Activity                                       Gait Training                                       Modalities             MHP lumbar PRN

## 2023-08-11 ENCOUNTER — APPOINTMENT (OUTPATIENT)
Dept: PHYSICAL THERAPY | Facility: MEDICAL CENTER | Age: 68
End: 2023-08-11
Payer: COMMERCIAL

## 2023-08-15 ENCOUNTER — OFFICE VISIT (OUTPATIENT)
Dept: PHYSICAL THERAPY | Facility: MEDICAL CENTER | Age: 68
End: 2023-08-15
Payer: COMMERCIAL

## 2023-08-15 DIAGNOSIS — M54.16 LUMBAR BACK PAIN WITH RADICULOPATHY AFFECTING LEFT LOWER EXTREMITY: Primary | ICD-10-CM

## 2023-08-15 PROCEDURE — 97110 THERAPEUTIC EXERCISES: CPT

## 2023-08-15 PROCEDURE — 97140 MANUAL THERAPY 1/> REGIONS: CPT

## 2023-08-16 DIAGNOSIS — E87.6 HYPOKALEMIA: ICD-10-CM

## 2023-08-16 RX ORDER — POTASSIUM CHLORIDE 20 MEQ/1
20 TABLET, EXTENDED RELEASE ORAL DAILY
Qty: 30 TABLET | Refills: 0 | Status: SHIPPED | OUTPATIENT
Start: 2023-08-16

## 2023-08-18 ENCOUNTER — OFFICE VISIT (OUTPATIENT)
Dept: PHYSICAL THERAPY | Facility: MEDICAL CENTER | Age: 68
End: 2023-08-18
Payer: COMMERCIAL

## 2023-08-18 DIAGNOSIS — M54.16 LUMBAR BACK PAIN WITH RADICULOPATHY AFFECTING LEFT LOWER EXTREMITY: Primary | ICD-10-CM

## 2023-08-18 PROCEDURE — 97112 NEUROMUSCULAR REEDUCATION: CPT | Performed by: PHYSICAL THERAPIST

## 2023-08-18 PROCEDURE — 97110 THERAPEUTIC EXERCISES: CPT | Performed by: PHYSICAL THERAPIST

## 2023-08-18 PROCEDURE — 97140 MANUAL THERAPY 1/> REGIONS: CPT | Performed by: PHYSICAL THERAPIST

## 2023-08-18 NOTE — PROGRESS NOTES
Daily Note     Today's date: 2023  Patient name: Sylvester Barros  : 1955  MRN: [id number]  Referring provider: Bard Bamberger, MD  Dx:   Encounter Diagnosis     ICD-10-CM    1. Lumbar back pain with radiculopathy affecting left lower extremity  M54.16                      Subjective: Patient reports that he is continuing to feel improved overall. He no longer has pain in his L leg. He continues to have some pain in the L side of his low back when getting out of bed in the morning. Objective: See treatment diary below      Assessment: Patient continues to respond well to manual interventions with report of decreased stiffness post-tx. Lumbar FLX AROM continues to improve. Able to perform bridges with ABD/ADD resistance, which demonstrates an improvement in hip girdle strength. Also able to add resistance to [employer], which demonstrates an improvement in proximal neuromotor control. He was educated in log rolling technique when transferring from supine to sit and sit to supine to minimize stress on lumbar spine. Patient tolerated treatment well and completed program without pain. Patient would benefit from continued PT to improve lumbopelvic mobility and progress core strengthening to achieve goals. Plan: Continue per plan of care. Precautions: HTN    HEP: SKTC, LTR  Manuals 7/25 7/27 7/31 8/4 8/7 8/10 8/15 8/18     L lumbar UPA mobs NV Gr. I-II KP Gr. I-II KP Gr. I-II KP Gr. I-II-III KP Gr. I-II-III KP Gr. III CB KP L Gr.  III     Theragun STM to L lumbar paraspinals/glutes NV KP KP KP KP KP CB KP                               Neuro Re-Ed             TA activation with pball NV 5"x20 supine 5"x30 supine 5"x30 supine 5"x30 supine 5"x30 stand 5"x30 stand 5"x30 stand     TA marches with pball NV 5"x20 ea supine 5"x20 ea supine 5"x30 ea supine 5"x30 ea supine 5"x30 ea supine 5"x30 ea supine 5"x30 ea supine 1#     Bridges NV x10 2x10 3x10 3x10 3x10                                 Ther Ex Rec bike             LTR 5"x10 ea HEP 5"x10 ea 5"x10 ea 5"x10 ea 5"x10 ea 5"x10 ea 5"x10 ea 5"x10 ea     SKTC 10"x10 b/l HEP 10"x10 b/l 10"x10 b/l 10"x10 b/l 10"x10 b/l 10"x10 b/l 10"x10 b/l 10"x10 b/l     Supine clams NV 5"x20 RTB 5"x20 GTB 5"x30 GTB 5"x30 GTB 5"x30 blue 5"x30 BTB last 10 combine with bridges x20 blue + bridge     Supine hip ADD isometrics NV 5"x20 5"x30 5"x30 5"x30 5"x30 5"x30 x20 + bridge     Seated piriformis stretch NV 20"x3 20"x3 20"x3 20"x3 30"x4 3x30" ea 30"x4 ea     Seated pball rollouts NV 10"x10 FWD 10"x10 FWD 10"x10 FWD 10"x10 FWD 10"x10 FWD 10"x10 FWD 10"x10 FWD     HEP education and instruction x8'            Ther Activity                                       Gait Training                                       Modalities             MHP lumbar PRN

## 2023-08-21 ENCOUNTER — OFFICE VISIT (OUTPATIENT)
Dept: PHYSICAL THERAPY | Facility: MEDICAL CENTER | Age: 68
End: 2023-08-21
Payer: COMMERCIAL

## 2023-08-21 DIAGNOSIS — M54.16 LUMBAR BACK PAIN WITH RADICULOPATHY AFFECTING LEFT LOWER EXTREMITY: Primary | ICD-10-CM

## 2023-08-21 PROCEDURE — 97112 NEUROMUSCULAR REEDUCATION: CPT | Performed by: PHYSICAL THERAPIST

## 2023-08-21 PROCEDURE — 97110 THERAPEUTIC EXERCISES: CPT | Performed by: PHYSICAL THERAPIST

## 2023-08-21 PROCEDURE — 97140 MANUAL THERAPY 1/> REGIONS: CPT | Performed by: PHYSICAL THERAPIST

## 2023-08-21 NOTE — PROGRESS NOTES
Daily Note     Today's date: 2023  Patient name: Maria Victoria Rockwell  : 1955  MRN: [id number]  Referring provider: Adan Billingsley MD  Dx:   Encounter Diagnosis     ICD-10-CM    1. Lumbar back pain with radiculopathy affecting left lower extremity  M54.16                      Subjective: Patient reports that he has been having pain in the L side of his back today. He has no pain radiating into his L leg. Objective: See treatment diary below      Assessment: Patient presented with slightly increased L lower lumbar hypomobility today compared to last session. He responded well to manual interventions with report of decreased L lumbar pain post-tx. Performed ABD/ADD strengthening without addition of bridges due to baseline soreness. Also performed TA marches in a gravity eliminated position due to baseline LBP. Patient tolerated session well and reported alleviation of symptoms post-tx. Patient would benefit from continued PT to improve lumbopelvic mobility, stability, and proximal neuromotor control to be able to stand, ambulate, and perform transfers. Plan: Continue per plan of care. Precautions: HTN    HEP: SKTC, LTR  Manuals 7/25 7/27 7/31 8/4 8/7 8/10 8/15 8/18 8/21    L lumbar UPA mobs NV Gr. I-II KP Gr. I-II KP Gr. I-II KP Gr. I-II-III KP Gr. I-II-III KP Gr. III CB KP L Gr. III KP L Gr.  III    Theragun STM to L lumbar paraspinals/glutes NV KP KP KP KP KP CB KP KP                              Neuro Re-Ed             TA activation with pball NV 5"x20 supine 5"x30 supine 5"x30 supine 5"x30 supine 5"x30 stand 5"x30 stand 5"x30 stand 5"x30 stand    TA marches with pball NV 5"x20 ea supine 5"x20 ea supine 5"x30 ea supine 5"x30 ea supine 5"x30 ea supine 5"x30 ea supine 5"x30 ea supine 1# 5"x30 ea supine no weight    Bridges NV x10 2x10 3x10 3x10 3x10                                 Ther Ex             Rec bike             LTR 5"x10 ea HEP 5"x10 ea 5"x10 ea 5"x10 ea 5"x10 ea 5"x10 ea 5"x10 ea 5"x10 ea 5"x10 ea    SKTC 10"x10 b/l HEP 10"x10 b/l 10"x10 b/l 10"x10 b/l 10"x10 b/l 10"x10 b/l 10"x10 b/l 10"x10 b/l 10"x10 b/l    Supine clams NV 5"x20 RTB 5"x20 GTB 5"x30 GTB 5"x30 GTB 5"x30 blue 5"x30 BTB last 10 combine with bridges x20 blue + bridge 5"x30 blue no bridge    Supine hip ADD isometrics NV 5"x20 5"x30 5"x30 5"x30 5"x30 5"x30 x20 + bridge 5"x30 no bridge    Seated piriformis stretch NV 20"x3 20"x3 20"x3 20"x3 30"x4 3x30" ea 30"x4 ea 30"x4 ea    Seated pball rollouts NV 10"x10 FWD 10"x10 FWD 10"x10 FWD 10"x10 FWD 10"x10 FWD 10"x10 FWD 10"x10 FWD 10"x10 FWD    HEP education and instruction x8'            Ther Activity                                       Gait Training                                       Modalities             MHP lumbar PRN Plan: cerave products or vanicream for daily care Continue Regimen: Dupixent monthly injection\\nclobetasol cream bid for extremeties and legs Otc Regimen: zyrtec two times a day Detail Level: Simple

## 2023-08-24 ENCOUNTER — OFFICE VISIT (OUTPATIENT)
Dept: SURGERY | Facility: CLINIC | Age: 68
End: 2023-08-24
Payer: COMMERCIAL

## 2023-08-24 VITALS
HEART RATE: 78 BPM | TEMPERATURE: 97.6 F | BODY MASS INDEX: 30.07 KG/M2 | WEIGHT: 203 LBS | SYSTOLIC BLOOD PRESSURE: 140 MMHG | DIASTOLIC BLOOD PRESSURE: 90 MMHG | HEIGHT: 69 IN

## 2023-08-24 DIAGNOSIS — I10 PRIMARY HYPERTENSION: Primary | ICD-10-CM

## 2023-08-24 DIAGNOSIS — Z12.11 COLON CANCER SCREENING: ICD-10-CM

## 2023-08-24 PROCEDURE — 99203 OFFICE O/P NEW LOW 30 MIN: CPT | Performed by: PHYSICIAN ASSISTANT

## 2023-08-24 NOTE — PROGRESS NOTES
Trisha Bright is a 76 y.o.male who is here for a:   Chief Complaint   Patient presents with   • Screening Colonoscopy     Had previous colonoscopy. No history of colon cancer. No issues for patient      Colon cancer screening visit. Also documented a history of reflux? Does take Prilosec. 76years old. No previous procedure listed on the chart for colonoscopy. Of hypertension and lower back pain. Assessment/Plan:   Trisha Bright is a 76 y.o.male who is here for a: Screening Colonoscopy. Plan: Colonoscopy for: Screening for Colon Cancer - last was in [facility]      Previous Colonoscopy and  Location of polyps if any:   5 years ago per patient there was no polyps but was told to follow up in 5 years      Preoperative Clearance: None      In preparation for this visit all relevant and known prior office notes, prior consultations, emergency room visits, blood work results, and imaging studies were personally reviewed. A total of  30 minutes was spent reviewing all of this information. , caring for this patient, providing differential diagnosis, instructions for management, counseling and coordination of care. This also includes planning surgical intervention where indicated. Preoperative Clearance: None      Patient was given full preop instructions including:  No fruits or vegetables or high roughage foods for 1 week prior to the procedure. Clear liquids the day before the procedure, nothing by mouth after midnight the day before for the procedure. Bowel prep instructions were given in writing to the patient and verbally reviewed. Notify the office if patient is on any blood thinners. Discontinue any weight loss medications 1 week prior to procedure unless instructed specifically otherwise. Consult primary care physician for management of any diabetic medications.     Continue beta-blockers but no other hypertensive medications prior to the procedure.    ______________________________________________________    HPI:  Amaury Mehta is a 76 y.o.male who was referred for evaluation of    First Screening. Currently:  Symptoms include:  no abdominal pain, change in bowel habits, or black or bloody stools. Family history of colon cancer: None reported       Anticoagulation: none    A1C:     LABS:      Lab Results   Component Value Date    WBC 6.12 06/09/2023    HGB 13.3 06/09/2023    HCT 42.3 06/09/2023    MCV 94 06/09/2023     06/09/2023     Lab Results   Component Value Date    K 3.5 07/03/2023     (H) 07/03/2023    CO2 28 07/03/2023    BUN 19 07/03/2023    CREATININE 1.25 07/03/2023    GLUF 96 07/03/2023    CALCIUM 9.4 07/03/2023    AST 29 06/09/2023    ALT 49 06/09/2023    ALKPHOS 72 06/09/2023    EGFR 58 07/03/2023     Lab Results   Component Value Date    HGBA1C 5.2 06/09/2023     No results found for: "INR", "PROTIME"      No orders to display           ROS:  General ROS: negative for - chills, fatigue, fever or night sweats, weight loss  Respiratory ROS: no cough, shortness of breath, or wheezing  Cardiovascular ROS: no chest pain or dyspnea on exertion  Genito-Urinary ROS: no dysuria, trouble voiding, or hematuria  Musculoskeletal ROS: negative for - gait disturbance, joint pain or muscle pain  Neurological ROS: no TIA or stroke symptoms  GI ROS: see HPI  Skin ROS: no new rashes or lesions   Lymphatic ROS: no new adenopathy noted by pt. GYN ROS: see HPI, no new GYN history or bleeding noted  Psy ROS: no new mental or behavioral disturbances           Imaging: No new pertinent imaging studies. Patient Active Problem List   Diagnosis   • Lumbar back pain with radiculopathy affecting left lower extremity   • Primary hypertension   • GERD (gastroesophageal reflux disease)   • Vitamin D deficiency   • Hypokalemia         Allergies:  Patient has no known allergies.       Current Outpatient Medications:   • amLODIPine (NORVASC) 10 mg tablet, Take 1 tablet (10 mg total) by mouth daily, Disp: 90 tablet, Rfl: 1  •  atenolol-chlorthalidone (TENORETIC) 50-25 mg per tablet, Take 1 tablet by mouth daily, Disp: 90 tablet, Rfl: 1  •  ergocalciferol (VITAMIN D2) 50,000 units, Take 1 capsule (50,000 Units total) by mouth once a week, Disp: 12 capsule, Rfl: 0  •  lisinopril (ZESTRIL) 30 mg tablet, Take 1 tablet (30 mg total) by mouth daily, Disp: 90 tablet, Rfl: 1  •  Multiple Vitamin (multivitamin) capsule, Take 1 capsule by mouth daily, Disp: , Rfl:   •  omeprazole (PriLOSEC) 20 mg delayed release capsule, Take 1 capsule (20 mg total) by mouth daily, Disp: 30 capsule, Rfl: 2  •  potassium chloride (K-DUR,KLOR-CON) 20 mEq tablet, Take 1 tablet (20 mEq total) by mouth daily, Disp: 30 tablet, Rfl: 0  •  methocarbamol (ROBAXIN) 500 mg tablet, Take 1 tablet (500 mg total) by mouth 4 (four) times a day for 14 days, Disp: 56 tablet, Rfl: 0  •  predniSONE 20 mg tablet, Take 2 tablets for 7 days then 1 tablet for 3 days (Patient not taking: Reported on 8/24/2023), Disp: 17 tablet, Rfl: 0    No past medical history on file. No past surgical history on file. No family history on file. Social History     Socioeconomic History   • Marital status: Single     Spouse name: None   • Number of children: None   • Years of education: None   • Highest education level: None   Occupational History   • None   Tobacco Use   • Smoking status: Never   • Smokeless tobacco: Never   Vaping Use   • Vaping Use: None   Substance and Sexual Activity   • Alcohol use:  Yes     Alcohol/week: 2.0 standard drinks of alcohol     Types: 1 Cans of beer, 1 Shots of liquor per week     Comment: social   • Drug use: Never   • Sexual activity: None   Other Topics Concern   • None   Social History Narrative   • None     Social Determinants of Health     Financial Resource Strain: Low Risk  (6/23/2023)    Overall Financial Resource Strain (CARDIA)    • Difficulty of Paying Living Expenses: Not very hard   Food Insecurity: Not on file   Transportation Needs: No Transportation Needs (6/23/2023)    PRAPARE - Transportation    • Lack of Transportation (Medical): No    • Lack of Transportation (Non-Medical): No   Physical Activity: Not on file   Stress: Not on file   Social Connections: Not on file   Intimate Partner Violence: Not on file   Housing Stability: Not on file       Exam:   Vitals:    08/24/23 1339   BP: 140/90   Pulse: 78   Temp: 97.6 °F (36.4 °C)           ______________________________________________________    PHYSICAL EXAM  General Appearance:    Alert, cooperative, no distress,      Head:    Normocephalic without obvious abnormality   Eyes:    PERRL, conjunctiva/corneas clear,        Neck:   Supple, no adenopathy, no JVD   Back:     Symmetric, no spinal or CVA tenderness   Lungs:     Clear to auscultation bilaterally,    Heart:    Regular rate and rhythm, S1 and S2 normal, no murmur   Abdomen:     Non-tender in RUQ, LUQ, RLQ, LLQ, no dc's signs. No visible masses or pulsations. Extremities:   Extremities normal. No clubbing, cyanosis or edema   Psych:   Normal Affect   Neurologic:   CNII-XII intact. Strength symmetric, speech intact                 Ron Coronado PA-C  Date: 8/24/2023 Time: 2:16 PM       This report has been generated by a voice recognition software system. Therefore, there may be syntax, spelling, and/or grammatical errors. Please call if you've any questions. This  encounter has been billed by a non certified Coder. Informed consent for procedure was personally discussed, reviewed, and signed by Dr. Lurene Bernheim. Discussion by Dr. Lurene Bernheim was carried out regarding risks, benefits, and alternatives with the patient. Risks include but are not limited to:  bleeding, infection, and delayed wound healing or an open wound, pulmonary embolus, leaks from bowel or bile ducts or other viscus, transfusions, death.   Discussed in further detail the more common complications and their rates of occurrence.  was used if necessary. Patient expressed understanding of the issues discussed and wished/consented to proceed. All questions were answered by Dr. Dominic Carlson. Discussion performed between patient and the provider signing below.      Signature:   Lafonda Runner, [place]  Date: 8/24/2023 Time: 2:16 PM

## 2023-08-25 ENCOUNTER — OFFICE VISIT (OUTPATIENT)
Dept: PHYSICAL THERAPY | Facility: MEDICAL CENTER | Age: 68
End: 2023-08-25
Payer: COMMERCIAL

## 2023-08-25 DIAGNOSIS — M54.16 LUMBAR BACK PAIN WITH RADICULOPATHY AFFECTING LEFT LOWER EXTREMITY: Primary | ICD-10-CM

## 2023-08-25 PROCEDURE — 97140 MANUAL THERAPY 1/> REGIONS: CPT | Performed by: PHYSICAL THERAPIST

## 2023-08-25 PROCEDURE — 97110 THERAPEUTIC EXERCISES: CPT | Performed by: PHYSICAL THERAPIST

## 2023-08-25 PROCEDURE — 97112 NEUROMUSCULAR REEDUCATION: CPT | Performed by: PHYSICAL THERAPIST

## 2023-08-25 NOTE — PROGRESS NOTES
Daily Note     Today's date: 2023  Patient name: Mis Mendenhall  : 1955  MRN: [id number]  Referring provider: Delmy Gonzalez MD  Dx:   Encounter Diagnosis     ICD-10-CM    1. Lumbar back pain with radiculopathy affecting left lower extremity  M54.16                      Subjective: Patient reports that he is feeling better with less pain in his back today and less pain into his leg. Objective: See treatment diary below      Assessment: Patient presented with lower symptom irritability today and improved speed of transfers. Continued with manual interventions to address mild remaining L lower lumbar hypomobility with patient reporting alleviation of symptoms post-tx. Able to return to ABD/ADD bridges, which demonstrates an improvement in hip girdle strength. Also able to perform TA activation interventions in a CKC position, which demonstrates an improvement in his ability to engage his feedforward mechanism. Patient tolerated treatment well and would benefit from continued PT to improve lumbopelvic mobility and core/hip girdle stability to return to PLOF. Plan: Continue per plan of care. Precautions: HTN    HEP: SKTC, LTR  Manuals 7/25 7/27 7/31 8/4 8/7 8/10 8/15 8/18 8/21 8/25   L lumbar UPA mobs NV Gr. I-II KP Gr. I-II KP Gr. I-II KP Gr. I-II-III KP Gr. I-II-III KP Gr. III CB KP L Gr. III KP L Gr. III KP L Gr.  III   Theragun STM to L lumbar paraspinals/glutes NV KP KP KP KP KP CB KP KP KP                             Neuro Re-Ed             TA activation with pball NV 5"x20 supine 5"x30 supine 5"x30 supine 5"x30 supine 5"x30 stand 5"x30 stand 5"x30 stand 5"x30 stand 5"x30 stand   TA marches with pball NV 5"x20 ea supine 5"x20 ea supine 5"x30 ea supine 5"x30 ea supine 5"x30 ea supine 5"x30 ea supine 5"x30 ea supine 1# 5"x30 ea supine no weight 5"x20 ea stand    Bridges NV x10 2x10 3x10 3x10 3x10                                 Ther Ex             Rec bike             LTR 5"x10 ea HEP 5"x10 ea 5"x10 ea 5"x10 ea 5"x10 ea 5"x10 ea 5"x10 ea 5"x10 ea 5"x10 ea 5"x10 ea   SKTC 10"x10 b/l HEP 10"x10 b/l 10"x10 b/l 10"x10 b/l 10"x10 b/l 10"x10 b/l 10"x10 b/l 10"x10 b/l 10"x10 b/l 10"x10 b/l   Supine clams NV 5"x20 RTB 5"x20 GTB 5"x30 GTB 5"x30 GTB 5"x30 blue 5"x30 BTB last 10 combine with bridges x20 blue + bridge 5"x30 blue no bridge 5"x30 blue + bridge   Supine hip ADD isometrics NV 5"x20 5"x30 5"x30 5"x30 5"x30 5"x30 x20 + bridge 5"x30 no bridge 5"x30 + bridge   Seated piriformis stretch NV 20"x3 20"x3 20"x3 20"x3 30"x4 3x30" ea 30"x4 ea 30"x4 ea 30"x4 ea   Seated pball rollouts NV 10"x10 FWD 10"x10 FWD 10"x10 FWD 10"x10 FWD 10"x10 FWD 10"x10 FWD 10"x10 FWD 10"x10 FWD 10"x10 FWD   HEP education and instruction x8'            Ther Activity                                       Gait Training                                       Modalities             MHP lumbar PRN

## 2023-08-28 ENCOUNTER — OFFICE VISIT (OUTPATIENT)
Dept: PHYSICAL THERAPY | Facility: MEDICAL CENTER | Age: 68
End: 2023-08-28
Payer: COMMERCIAL

## 2023-08-28 DIAGNOSIS — M54.16 LUMBAR BACK PAIN WITH RADICULOPATHY AFFECTING LEFT LOWER EXTREMITY: Primary | ICD-10-CM

## 2023-08-28 PROCEDURE — 97110 THERAPEUTIC EXERCISES: CPT | Performed by: PHYSICAL THERAPIST

## 2023-08-28 PROCEDURE — 97112 NEUROMUSCULAR REEDUCATION: CPT | Performed by: PHYSICAL THERAPIST

## 2023-08-28 PROCEDURE — 97140 MANUAL THERAPY 1/> REGIONS: CPT | Performed by: PHYSICAL THERAPIST

## 2023-08-28 NOTE — PROGRESS NOTES
Daily Note     Today's date: 2023  Patient name: Kelsey Dowd  : 1955  MRN: [id number]  Referring provider: Bridget Perez MD  Dx:   Encounter Diagnosis     ICD-10-CM    1. Lumbar back pain with radiculopathy affecting left lower extremity  M54.16                      Subjective: Patient reports that he felt relief after last session with less pain, but the pain returned this morning. The pain is located in his low back, and he has no pain in his L leg. He will be traveling next week for over a month. Objective: See treatment diary below      Assessment: Patient continues to respond well to manual interventions with report of decreased pain when ambulating post-tx. Performed resisted ABD/ADD exercises without bridges today due to baseline soreness. Also performed TA activation interventions in a gravity eliminated position due to higher symptom irritability. Added 3-way pball rollouts to improve multiplanar lumbopelvic mobility. Patient tolerated treatment well and reported alleviation of symptoms post-tx. Patient would benefit from continued PT to maximize independence with HEP prior to upcoming d/c.      Plan: Continue per POC. Potential d/c next visit prior to upcoming travel. Precautions: HTN    HEP: SKTC, LTR  Manuals 7/25 7/27 7/31 8/4 8/7 8/10 8/15 8/18 8/21 8/25 8/28   L lumbar UPA mobs NV Gr. I-II KP Gr. I-II KP Gr. I-II KP Gr. I-II-III KP Gr. I-II-III KP Gr. III CB KP L Gr. III KP L Gr. III KP L Gr. III KP L Gr.  III   Theragun STM to L lumbar paraspinals/glutes NV KP KP KP KP KP CB KP KP KP KP                               Neuro Re-Ed              TA activation with pball NV 5"x20 supine 5"x30 supine 5"x30 supine 5"x30 supine 5"x30 stand 5"x30 stand 5"x30 stand 5"x30 stand 5"x30 stand 5"x30 supine   TA marches with pball NV 5"x20 ea supine 5"x20 ea supine 5"x30 ea supine 5"x30 ea supine 5"x30 ea supine 5"x30 ea supine 5"x30 ea supine 1# 5"x30 ea supine no weight 5"x20 ea stand  5"x30 ea supine   Bridges NV x10 2x10 3x10 3x10 3x10                                    Ther Ex              Rec bike              LTR 5"x10 ea HEP 5"x10 ea 5"x10 ea 5"x10 ea 5"x10 ea 5"x10 ea 5"x10 ea 5"x10 ea 5"x10 ea 5"x10 ea 5"x10 ea   SKTC 10"x10 b/l HEP 10"x10 b/l 10"x10 b/l 10"x10 b/l 10"x10 b/l 10"x10 b/l 10"x10 b/l 10"x10 b/l 10"x10 b/l 10"x10 b/l 10"x10 b/l   Supine clams NV 5"x20 RTB 5"x20 GTB 5"x30 GTB 5"x30 GTB 5"x30 blue 5"x30 BTB last 10 combine with bridges x20 blue + bridge 5"x30 blue no bridge 5"x30 blue + bridge 5"x30 blue no bridge   Supine hip ADD isometrics NV 5"x20 5"x30 5"x30 5"x30 5"x30 5"x30 x20 + bridge 5"x30 no bridge 5"x30 + bridge 5"x30 no bridge   Seated piriformis stretch NV 20"x3 20"x3 20"x3 20"x3 30"x4 3x30" ea 30"x4 ea 30"x4 ea 30"x4 ea 30"x4 ea   Seated pball rollouts NV 10"x10 FWD 10"x10 FWD 10"x10 FWD 10"x10 FWD 10"x10 FWD 10"x10 FWD 10"x10 FWD 10"x10 FWD 10"x10 FWD 10"x10 3-way   HEP education and instruction x8'             Ther Activity                                          Gait Training                                          Modalities              MHP lumbar PRN

## 2023-09-01 ENCOUNTER — OFFICE VISIT (OUTPATIENT)
Dept: PHYSICAL THERAPY | Facility: MEDICAL CENTER | Age: 68
End: 2023-09-01
Payer: COMMERCIAL

## 2023-09-01 DIAGNOSIS — M54.16 LUMBAR BACK PAIN WITH RADICULOPATHY AFFECTING LEFT LOWER EXTREMITY: Primary | ICD-10-CM

## 2023-09-01 PROCEDURE — 97140 MANUAL THERAPY 1/> REGIONS: CPT | Performed by: PHYSICAL THERAPIST

## 2023-09-01 PROCEDURE — 97110 THERAPEUTIC EXERCISES: CPT | Performed by: PHYSICAL THERAPIST

## 2023-09-01 PROCEDURE — 97112 NEUROMUSCULAR REEDUCATION: CPT | Performed by: PHYSICAL THERAPIST

## 2023-09-01 NOTE — PROGRESS NOTES
Discharge Summary     Today's date: 2023  Patient name: Andrew Rojo  : 1955  MRN: [id number]  Referring provider: Rachael Vernon MD  Dx:   Encounter Diagnosis     ICD-10-CM    1. Lumbar back pain with radiculopathy affecting left lower extremity  M54.16                      Subjective: Patient reports that he is noticing improvements since beginning therapy with less pain in his L leg and less pain in his back overall. He notes that he continues to have some discomfort at times, but he is comfortable with his home exercise program. Today will be his last visit because he will be traveling for approximately 6 weeks. Objective: See treatment diary below      Assessment: Patient has demonstrated good progress with improving his lumbar FLX and EXT AROM since his initial visit, which has improved the quality of his gait mechanics for ambulation. He has also demonstrated improvements in his ability to engage his feedforward mechanism and has improved his hip girdle strength, which has reduced the compensatory soft tissue tension in his lumbar spine during ADL. Patient has met all of his goals for PT except for exceeding MCID on functional outcome measure. However, patient is independent in an illustrated HEP for continued lumbopelvic mobility, postural control, and core/hip girdle strength. Patient is now discharged to his HEP due to upcoming prolonged travel. Will evaluate if needed in the future if symptoms return and/or worsen. Goals  STG:  Patient will be independent with home exercise program.- met   Patient will be able to perform a proper TA contraction to demonstrate improved postural control during ADL. - met  LTG:  Patient will demonstrate at least 50% reduction in soft tissue tension in L QL/erector spinae/paraspinals to improve standing tolerance. - met  Patient will increase lumbar EXT AROM to be able to ambulate longer distances.-met  Patient will exceed MCID on FOTO to demonstrate improved function. - not met (improved since IE; patient is independent in an illustrated HEP for continued mobility/strength)  Patient will be able to manage symptoms independently. - met       Plan: Discharge to HEP. Will follow-up if needed in the future upon return from travel. Precautions: HTN    HEP: SKTC, LTR  Manuals 7/25 7/27 7/31 8/4 8/7 8/10 8/15 8/18 8/21 8/25 8/28 9/1   L lumbar UPA mobs NV Gr. I-II KP Gr. I-II KP Gr. I-II KP Gr. I-II-III KP Gr. I-II-III KP Gr. III CB KP L Gr. III KP L Gr. III KP L Gr. III KP L Gr. III KP L Gr.  III   Theragun STM to L lumbar paraspinals/glutes NV KP KP KP KP KP CB KP KP KP KP KP                                 Neuro Re-Ed               TA activation with pball NV 5"x20 supine 5"x30 supine 5"x30 supine 5"x30 supine 5"x30 stand 5"x30 stand 5"x30 stand 5"x30 stand 5"x30 stand 5"x30 supine 5"x30 supine   TA marches with pball NV 5"x20 ea supine 5"x20 ea supine 5"x30 ea supine 5"x30 ea supine 5"x30 ea supine 5"x30 ea supine 5"x30 ea supine 1# 5"x30 ea supine no weight 5"x20 ea stand  5"x30 ea supine 5"x30 ea supine   Bridges NV x10 2x10 3x10 3x10 3x10                                       Ther Ex               Rec bike               LTR 5"x10 ea HEP 5"x10 ea 5"x10 ea 5"x10 ea 5"x10 ea 5"x10 ea 5"x10 ea 5"x10 ea 5"x10 ea 5"x10 ea 5"x10 ea 5"x10 ea   SKTC 10"x10 b/l HEP 10"x10 b/l 10"x10 b/l 10"x10 b/l 10"x10 b/l 10"x10 b/l 10"x10 b/l 10"x10 b/l 10"x10 b/l 10"x10 b/l 10"x10 b/l 10"x10 b/l   Supine clams NV 5"x20 RTB 5"x20 GTB 5"x30 GTB 5"x30 GTB 5"x30 blue 5"x30 BTB last 10 combine with bridges x20 blue + bridge 5"x30 blue no bridge 5"x30 blue + bridge 5"x30 blue no bridge 5"x30 blue   Supine hip ADD isometrics NV 5"x20 5"x30 5"x30 5"x30 5"x30 5"x30 x20 + bridge 5"x30 no bridge 5"x30 + bridge 5"x30 no bridge 5"x30   Seated piriformis stretch NV 20"x3 20"x3 20"x3 20"x3 30"x4 3x30" ea 30"x4 ea 30"x4 ea 30"x4 ea 30"x4 ea 30"x4 ea   Seated pball rollouts NV 10"x10 FWD 10"x10 FWD 10"x10 FWD 10"x10 FWD 10"x10 FWD 10"x10 FWD 10"x10 FWD 10"x10 FWD 10"x10 FWD 10"x10 3-way 10"x10 3-way   HEP education and instruction x8'              Ther Activity                                             Gait Training                                             Modalities               MHP lumbar PRN

## 2023-09-07 DIAGNOSIS — M54.16 LUMBAR BACK PAIN WITH RADICULOPATHY AFFECTING LEFT LOWER EXTREMITY: ICD-10-CM

## 2023-09-07 RX ORDER — OMEPRAZOLE 20 MG/1
20 CAPSULE, DELAYED RELEASE ORAL DAILY
Qty: 30 CAPSULE | Refills: 2 | Status: SHIPPED | OUTPATIENT
Start: 2023-09-07

## 2023-09-19 DIAGNOSIS — E87.6 HYPOKALEMIA: ICD-10-CM

## 2023-09-19 RX ORDER — POTASSIUM CHLORIDE 20 MEQ/1
20 TABLET, EXTENDED RELEASE ORAL DAILY
Qty: 30 TABLET | Refills: 2 | Status: SHIPPED | OUTPATIENT
Start: 2023-09-19

## 2023-10-10 NOTE — PROGRESS NOTES
Merlene Valerio is a 76 y.o.male who is here for a:   Chief Complaint   Patient presents with    History and physical     colonoscopy      Colon cancer screening visit. Also documented a history of reflux? Does take Prilosec. 76years old. No previous procedure listed on the chart for colonoscopy. Of hypertension and lower back pain. Assessment/Plan:   Merlene Valerio is a 76 y.o.male who is here for a: Screening Colonoscopy. Plan: Colonoscopy for: Screening for Colon Cancer - last was in [facility]      Previous Colonoscopy and  Location of polyps if any:   5 years ago per patient there was no polyps but was told to follow up in 5 years      Preoperative Clearance: None      In preparation for this visit all relevant and known prior office notes, prior consultations, emergency room visits, blood work results, and imaging studies were personally reviewed. A total of  30 minutes was spent reviewing all of this information. , caring for this patient, providing differential diagnosis, instructions for management, counseling and coordination of care. This also includes planning surgical intervention where indicated. Preoperative Clearance: None      Patient was given full preop instructions including:  No fruits or vegetables or high roughage foods for 1 week prior to the procedure. Clear liquids the day before the procedure, nothing by mouth after midnight the day before for the procedure. Bowel prep instructions were given in writing to the patient and verbally reviewed. Notify the office if patient is on any blood thinners. Discontinue any weight loss medications 1 week prior to procedure unless instructed specifically otherwise. Consult primary care physician for management of any diabetic medications.     Continue beta-blockers but no other hypertensive medications prior to the procedure.    ______________________________________________________    HPI:  Merlene Valerio is a 76 y.o.male who was referred for evaluation of    First Screening. Currently:  Symptoms include:  no abdominal pain, change in bowel habits, or black or bloody stools. Family history of colon cancer: None reported       Anticoagulation: none    A1C:     LABS:      Lab Results   Component Value Date    WBC 6.12 06/09/2023    HGB 13.3 06/09/2023    HCT 42.3 06/09/2023    MCV 94 06/09/2023     06/09/2023     Lab Results   Component Value Date    K 3.5 07/03/2023     (H) 07/03/2023    CO2 28 07/03/2023    BUN 19 07/03/2023    CREATININE 1.25 07/03/2023    GLUF 96 07/03/2023    CALCIUM 9.4 07/03/2023    AST 29 06/09/2023    ALT 49 06/09/2023    ALKPHOS 72 06/09/2023    EGFR 58 07/03/2023     Lab Results   Component Value Date    HGBA1C 5.2 06/09/2023     No results found for: "INR", "PROTIME"      No orders to display           ROS:  General ROS: negative for - chills, fatigue, fever or night sweats, weight loss  Respiratory ROS: no cough, shortness of breath, or wheezing  Cardiovascular ROS: no chest pain or dyspnea on exertion  Genito-Urinary ROS: no dysuria, trouble voiding, or hematuria  Musculoskeletal ROS: negative for - gait disturbance, joint pain or muscle pain  Neurological ROS: no TIA or stroke symptoms  GI ROS: see HPI  Skin ROS: no new rashes or lesions   Lymphatic ROS: no new adenopathy noted by pt. GYN ROS: see HPI, no new GYN history or bleeding noted  Psy ROS: no new mental or behavioral disturbances           Imaging: No new pertinent imaging studies. Patient Active Problem List   Diagnosis    Lumbar back pain with radiculopathy affecting left lower extremity    Primary hypertension    GERD (gastroesophageal reflux disease)    Vitamin D deficiency    Hypokalemia         Allergies:  Patient has no known allergies.       Current Outpatient Medications:     omeprazole (PriLOSEC) 20 mg delayed release capsule, Take 1 capsule (20 mg total) by mouth daily, Disp: 30 capsule, Rfl: 2    amLODIPine (NORVASC) 10 mg tablet, Take 1 tablet (10 mg total) by mouth daily, Disp: 90 tablet, Rfl: 1    atenolol-chlorthalidone (TENORETIC) 50-25 mg per tablet, Take 1 tablet by mouth daily, Disp: 90 tablet, Rfl: 1    ergocalciferol (VITAMIN D2) 50,000 units, Take 1 capsule (50,000 Units total) by mouth once a week, Disp: 12 capsule, Rfl: 0    lisinopril (ZESTRIL) 30 mg tablet, Take 1 tablet (30 mg total) by mouth daily, Disp: 90 tablet, Rfl: 1    methocarbamol (ROBAXIN) 500 mg tablet, Take 1 tablet (500 mg total) by mouth 4 (four) times a day for 14 days, Disp: 56 tablet, Rfl: 0    Multiple Vitamin (multivitamin) capsule, Take 1 capsule by mouth daily, Disp: , Rfl:     potassium chloride (K-DUR,KLOR-CON) 20 mEq tablet, Take 1 tablet (20 mEq total) by mouth daily, Disp: 30 tablet, Rfl: 2    predniSONE 20 mg tablet, Take 2 tablets for 7 days then 1 tablet for 3 days (Patient not taking: Reported on 8/24/2023), Disp: 17 tablet, Rfl: 0    No past medical history on file. No past surgical history on file. No family history on file. Social History     Socioeconomic History    Marital status: Single     Spouse name: Not on file    Number of children: Not on file    Years of education: Not on file    Highest education level: Not on file   Occupational History    Not on file   Tobacco Use    Smoking status: Never    Smokeless tobacco: Never   Vaping Use    Vaping Use: Not on file   Substance and Sexual Activity    Alcohol use:  Yes     Alcohol/week: 2.0 standard drinks of alcohol     Types: 1 Cans of beer, 1 Shots of liquor per week     Comment: social    Drug use: Never    Sexual activity: Not on file   Other Topics Concern    Not on file   Social History Narrative    Not on file     Social Determinants of Health     Financial Resource Strain: Low Risk  (6/23/2023)    Overall Financial Resource Strain (CARDIA)     Difficulty of Paying Living Expenses: Not very hard   Food Insecurity: Not on file Transportation Needs: No Transportation Needs (6/23/2023)    PRAPARE - Transportation     Lack of Transportation (Medical): No     Lack of Transportation (Non-Medical): No   Physical Activity: Not on file   Stress: Not on file   Social Connections: Not on file   Intimate Partner Violence: Not on file   Housing Stability: Not on file       Exam:   Vitals:    10/19/23 1306   BP: 142/84   Pulse: 62           ______________________________________________________    PHYSICAL EXAM  General Appearance:    Alert, cooperative, no distress,      Head:    Normocephalic without obvious abnormality   Eyes:    PERRL, conjunctiva/corneas clear,        Neck:   Supple, no adenopathy, no JVD   Back:     Symmetric, no spinal or CVA tenderness   Lungs:     Clear to auscultation bilaterally,    Heart:    Regular rate and rhythm, S1 and S2 normal, no murmur   Abdomen:     Non-tender in RUQ, LUQ, RLQ, LLQ, no dc's signs. No visible masses or pulsations. Extremities:   Extremities normal. No clubbing, cyanosis or edema   Psych:   Normal Affect   Neurologic:   CNII-XII intact. Strength symmetric, speech intact                 Anupama Guillen PA-C  Date: 10/19/2023 Time: 1:07 PM       This report has been generated by a voice recognition software system. Therefore, there may be syntax, spelling, and/or grammatical errors. Please call if you've any questions. This  encounter has been billed by a non certified Coder. Informed consent for procedure was personally discussed, reviewed, and signed by Dr. Neville Rojas. Discussion by Dr. Neville Rojas was carried out regarding risks, benefits, and alternatives with the patient. Risks include but are not limited to:  bleeding, infection, and delayed wound healing or an open wound, pulmonary embolus, leaks from bowel or bile ducts or other viscus, transfusions, death. Discussed in further detail the more common complications and their rates of occurrence.    was used if necessary. Patient expressed understanding of the issues discussed and wished/consented to proceed. All questions were answered by Dr. Lei Schilling. Discussion performed between patient and the provider signing below.      Signature:   Alice Perez  Date: 10/19/2023 Time: 1:07 PM

## 2023-10-10 NOTE — H&P (VIEW-ONLY)
Leila Roldan is a 76 y.o.male who is here for a:   Chief Complaint   Patient presents with    History and physical     colonoscopy      Colon cancer screening visit. Also documented a history of reflux? Does take Prilosec. 76years old. No previous procedure listed on the chart for colonoscopy. Of hypertension and lower back pain. Assessment/Plan:   Leila Roldan is a 76 y.o.male who is here for a: Screening Colonoscopy. Plan: Colonoscopy for: Screening for Colon Cancer - last was in [facility]      Previous Colonoscopy and  Location of polyps if any:   5 years ago per patient there was no polyps but was told to follow up in 5 years      Preoperative Clearance: None      In preparation for this visit all relevant and known prior office notes, prior consultations, emergency room visits, blood work results, and imaging studies were personally reviewed. A total of  30 minutes was spent reviewing all of this information. , caring for this patient, providing differential diagnosis, instructions for management, counseling and coordination of care. This also includes planning surgical intervention where indicated. Preoperative Clearance: None      Patient was given full preop instructions including:  No fruits or vegetables or high roughage foods for 1 week prior to the procedure. Clear liquids the day before the procedure, nothing by mouth after midnight the day before for the procedure. Bowel prep instructions were given in writing to the patient and verbally reviewed. Notify the office if patient is on any blood thinners. Discontinue any weight loss medications 1 week prior to procedure unless instructed specifically otherwise. Consult primary care physician for management of any diabetic medications.     Continue beta-blockers but no other hypertensive medications prior to the procedure.    ______________________________________________________    HPI:  Leila Roldan is a 76 y.o.male who was referred for evaluation of    First Screening. Currently:  Symptoms include:  no abdominal pain, change in bowel habits, or black or bloody stools. Family history of colon cancer: None reported       Anticoagulation: none    A1C:     LABS:      Lab Results   Component Value Date    WBC 6.12 06/09/2023    HGB 13.3 06/09/2023    HCT 42.3 06/09/2023    MCV 94 06/09/2023     06/09/2023     Lab Results   Component Value Date    K 3.5 07/03/2023     (H) 07/03/2023    CO2 28 07/03/2023    BUN 19 07/03/2023    CREATININE 1.25 07/03/2023    GLUF 96 07/03/2023    CALCIUM 9.4 07/03/2023    AST 29 06/09/2023    ALT 49 06/09/2023    ALKPHOS 72 06/09/2023    EGFR 58 07/03/2023     Lab Results   Component Value Date    HGBA1C 5.2 06/09/2023     No results found for: "INR", "PROTIME"      No orders to display           ROS:  General ROS: negative for - chills, fatigue, fever or night sweats, weight loss  Respiratory ROS: no cough, shortness of breath, or wheezing  Cardiovascular ROS: no chest pain or dyspnea on exertion  Genito-Urinary ROS: no dysuria, trouble voiding, or hematuria  Musculoskeletal ROS: negative for - gait disturbance, joint pain or muscle pain  Neurological ROS: no TIA or stroke symptoms  GI ROS: see HPI  Skin ROS: no new rashes or lesions   Lymphatic ROS: no new adenopathy noted by pt. GYN ROS: see HPI, no new GYN history or bleeding noted  Psy ROS: no new mental or behavioral disturbances           Imaging: No new pertinent imaging studies. Patient Active Problem List   Diagnosis    Lumbar back pain with radiculopathy affecting left lower extremity    Primary hypertension    GERD (gastroesophageal reflux disease)    Vitamin D deficiency    Hypokalemia         Allergies:  Patient has no known allergies.       Current Outpatient Medications:     omeprazole (PriLOSEC) 20 mg delayed release capsule, Take 1 capsule (20 mg total) by mouth daily, Disp: 30 capsule, Rfl: 2    amLODIPine (NORVASC) 10 mg tablet, Take 1 tablet (10 mg total) by mouth daily, Disp: 90 tablet, Rfl: 1    atenolol-chlorthalidone (TENORETIC) 50-25 mg per tablet, Take 1 tablet by mouth daily, Disp: 90 tablet, Rfl: 1    ergocalciferol (VITAMIN D2) 50,000 units, Take 1 capsule (50,000 Units total) by mouth once a week, Disp: 12 capsule, Rfl: 0    lisinopril (ZESTRIL) 30 mg tablet, Take 1 tablet (30 mg total) by mouth daily, Disp: 90 tablet, Rfl: 1    methocarbamol (ROBAXIN) 500 mg tablet, Take 1 tablet (500 mg total) by mouth 4 (four) times a day for 14 days, Disp: 56 tablet, Rfl: 0    Multiple Vitamin (multivitamin) capsule, Take 1 capsule by mouth daily, Disp: , Rfl:     potassium chloride (K-DUR,KLOR-CON) 20 mEq tablet, Take 1 tablet (20 mEq total) by mouth daily, Disp: 30 tablet, Rfl: 2    predniSONE 20 mg tablet, Take 2 tablets for 7 days then 1 tablet for 3 days (Patient not taking: Reported on 8/24/2023), Disp: 17 tablet, Rfl: 0    No past medical history on file. No past surgical history on file. No family history on file. Social History     Socioeconomic History    Marital status: Single     Spouse name: Not on file    Number of children: Not on file    Years of education: Not on file    Highest education level: Not on file   Occupational History    Not on file   Tobacco Use    Smoking status: Never    Smokeless tobacco: Never   Vaping Use    Vaping Use: Not on file   Substance and Sexual Activity    Alcohol use:  Yes     Alcohol/week: 2.0 standard drinks of alcohol     Types: 1 Cans of beer, 1 Shots of liquor per week     Comment: social    Drug use: Never    Sexual activity: Not on file   Other Topics Concern    Not on file   Social History Narrative    Not on file     Social Determinants of Health     Financial Resource Strain: Low Risk  (6/23/2023)    Overall Financial Resource Strain (CARDIA)     Difficulty of Paying Living Expenses: Not very hard   Food Insecurity: Not on file Transportation Needs: No Transportation Needs (6/23/2023)    PRAPARE - Transportation     Lack of Transportation (Medical): No     Lack of Transportation (Non-Medical): No   Physical Activity: Not on file   Stress: Not on file   Social Connections: Not on file   Intimate Partner Violence: Not on file   Housing Stability: Not on file       Exam:   Vitals:    10/19/23 1306   BP: 142/84   Pulse: 62           ______________________________________________________    PHYSICAL EXAM  General Appearance:    Alert, cooperative, no distress,      Head:    Normocephalic without obvious abnormality   Eyes:    PERRL, conjunctiva/corneas clear,        Neck:   Supple, no adenopathy, no JVD   Back:     Symmetric, no spinal or CVA tenderness   Lungs:     Clear to auscultation bilaterally,    Heart:    Regular rate and rhythm, S1 and S2 normal, no murmur   Abdomen:     Non-tender in RUQ, LUQ, RLQ, LLQ, no dc's signs. No visible masses or pulsations. Extremities:   Extremities normal. No clubbing, cyanosis or edema   Psych:   Normal Affect   Neurologic:   CNII-XII intact. Strength symmetric, speech intact                 Kal Ulloa PA-C  Date: 10/19/2023 Time: 1:07 PM       This report has been generated by a voice recognition software system. Therefore, there may be syntax, spelling, and/or grammatical errors. Please call if you've any questions. This  encounter has been billed by a non certified Coder. Informed consent for procedure was personally discussed, reviewed, and signed by Dr. Isaac Pillai. Discussion by Dr. Isaac Pillai was carried out regarding risks, benefits, and alternatives with the patient. Risks include but are not limited to:  bleeding, infection, and delayed wound healing or an open wound, pulmonary embolus, leaks from bowel or bile ducts or other viscus, transfusions, death. Discussed in further detail the more common complications and their rates of occurrence.    was used if necessary. Patient expressed understanding of the issues discussed and wished/consented to proceed. All questions were answered by Dr. [place] Alan. Discussion performed between patient and the provider signing below.      Signature:   Alice Lazcano  Date: 10/19/2023 Time: 1:07 PM

## 2023-10-19 ENCOUNTER — OFFICE VISIT (OUTPATIENT)
Dept: SURGERY | Facility: CLINIC | Age: 68
End: 2023-10-19

## 2023-10-19 VITALS
HEART RATE: 62 BPM | WEIGHT: 208 LBS | DIASTOLIC BLOOD PRESSURE: 84 MMHG | SYSTOLIC BLOOD PRESSURE: 142 MMHG | BODY MASS INDEX: 30.72 KG/M2

## 2023-10-19 DIAGNOSIS — Z12.11 ENCOUNTER FOR SCREENING COLONOSCOPY: Primary | ICD-10-CM

## 2023-10-19 DIAGNOSIS — I10 PRIMARY HYPERTENSION: ICD-10-CM

## 2023-10-19 PROCEDURE — NC001 PR NO CHARGE: Performed by: PHYSICIAN ASSISTANT

## 2023-10-23 RX ORDER — ONDANSETRON 2 MG/ML
4 INJECTION INTRAMUSCULAR; INTRAVENOUS ONCE AS NEEDED
Status: CANCELLED | OUTPATIENT
Start: 2023-10-23

## 2023-10-23 RX ORDER — SODIUM CHLORIDE 9 MG/ML
125 INJECTION, SOLUTION INTRAVENOUS CONTINUOUS
Status: CANCELLED | OUTPATIENT
Start: 2023-10-23

## 2023-10-23 RX ORDER — ALBUTEROL SULFATE 2.5 MG/3ML
2.5 SOLUTION RESPIRATORY (INHALATION) ONCE AS NEEDED
Status: CANCELLED | OUTPATIENT
Start: 2023-10-23

## 2023-10-24 ENCOUNTER — ANESTHESIA EVENT (OUTPATIENT)
Dept: GASTROENTEROLOGY | Facility: HOSPITAL | Age: 68
End: 2023-10-24

## 2023-10-24 ENCOUNTER — HOSPITAL ENCOUNTER (OUTPATIENT)
Dept: GASTROENTEROLOGY | Facility: HOSPITAL | Age: 68
Setting detail: OUTPATIENT SURGERY
Discharge: HOME | End: 2023-10-24
Attending: SURGERY
Payer: COMMERCIAL

## 2023-10-24 ENCOUNTER — ANESTHESIA (OUTPATIENT)
Dept: GASTROENTEROLOGY | Facility: HOSPITAL | Age: 68
End: 2023-10-24

## 2023-10-24 VITALS
WEIGHT: 208 LBS | DIASTOLIC BLOOD PRESSURE: 83 MMHG | SYSTOLIC BLOOD PRESSURE: 160 MMHG | HEART RATE: 62 BPM | TEMPERATURE: 98.4 F | HEIGHT: 69 IN | BODY MASS INDEX: 30.81 KG/M2 | OXYGEN SATURATION: 100 % | RESPIRATION RATE: 18 BRPM

## 2023-10-24 DIAGNOSIS — Z12.11 ENCOUNTER FOR SCREENING FOR MALIGNANT NEOPLASM OF COLON: ICD-10-CM

## 2023-10-24 PROBLEM — M54.50 CHRONIC LOW BACK PAIN: Status: ACTIVE | Noted: 2023-10-24

## 2023-10-24 PROBLEM — G89.29 CHRONIC LOW BACK PAIN: Status: ACTIVE | Noted: 2023-10-24

## 2023-10-24 PROCEDURE — G0121 COLON CA SCRN NOT HI RSK IND: HCPCS | Performed by: SURGERY

## 2023-10-24 RX ORDER — PROPOFOL 10 MG/ML
INJECTION, EMULSION INTRAVENOUS AS NEEDED
Status: DISCONTINUED | OUTPATIENT
Start: 2023-10-24 | End: 2023-10-24

## 2023-10-24 RX ORDER — SODIUM CHLORIDE 9 MG/ML
125 INJECTION, SOLUTION INTRAVENOUS CONTINUOUS
Status: DISCONTINUED | OUTPATIENT
Start: 2023-10-24 | End: 2023-10-28 | Stop reason: HOSPADM

## 2023-10-24 RX ORDER — PROPOFOL 10 MG/ML
INJECTION, EMULSION INTRAVENOUS CONTINUOUS PRN
Status: DISCONTINUED | OUTPATIENT
Start: 2023-10-24 | End: 2023-10-24

## 2023-10-24 RX ORDER — ONDANSETRON 2 MG/ML
4 INJECTION INTRAMUSCULAR; INTRAVENOUS ONCE AS NEEDED
Status: DISCONTINUED | OUTPATIENT
Start: 2023-10-24 | End: 2023-10-28 | Stop reason: HOSPADM

## 2023-10-24 RX ORDER — ALBUTEROL SULFATE 2.5 MG/3ML
2.5 SOLUTION RESPIRATORY (INHALATION) ONCE AS NEEDED
Status: DISCONTINUED | OUTPATIENT
Start: 2023-10-24 | End: 2023-10-28 | Stop reason: HOSPADM

## 2023-10-24 RX ADMIN — SODIUM CHLORIDE: 0.9 INJECTION, SOLUTION INTRAVENOUS at 07:27

## 2023-10-24 RX ADMIN — PROPOFOL 120 MG: 10 INJECTION, EMULSION INTRAVENOUS at 07:29

## 2023-10-24 RX ADMIN — PROPOFOL 100 MCG/KG/MIN: 10 INJECTION, EMULSION INTRAVENOUS at 07:30

## 2023-10-24 NOTE — DISCHARGE INSTR - AVS FIRST PAGE
[facility]  Endoscopy Post-Operative Instructions  Dr. Norval Hodgkin, MD, FACS    Procedure: Colonoscopy    Findings:  Diverticulosis and Hemorrhoids    Follow-Up: You will need a repeat Endoscopy in (generally)7 -10 years. No polyps seen   Extensive divertiulosis       You should have an endoscopy sooner than recommended if you have any symptoms of bleeding or change in stools or other concerns. You will receive a call from our office with your results, in addition to the the preliminary results you received today. You will usually receive a follow-up letter from our office in 1-2 weeks. Call the office if you do not hear from us. You are welcome to also schedule an office visit if desired to discuss the results further. It is your responsibility to contact our office for results in 1- 2 weeks if you do not hear from us. If a follow up endoscopy is needed, you are responsible for arranging that follow up appointment at the appropriate time. The office may or may not issue a reminder at that future time. Please take responsibility for your own follow up healthcare. Diet: Eat a light snack first, and then resume your previous diet. Call the office if you have unusual fevers, chills, nausea or vomiting or abdominal pain. Report to the emergency room with these are severe in nature. Activity: Do not drive a car, operate machinery, or sign legal documents for 24 hours after your procedure. Normal activity may be resumed on the day following the procedure. Call the office at [phone] for any of the following: Severe abdominal pain, significant rectal bleeding, chills, or fever above 100°, new onset of persistent cough or persistent vomiting.      [address] [facility], [address] [place] [address]  Phone: [phone]

## 2023-10-24 NOTE — ANESTHESIA POSTPROCEDURE EVALUATION
Post-Op Assessment Note    CV Status:  Stable    Pain management: adequate     Mental Status:  Alert and awake   Hydration Status:  Euvolemic   PONV Controlled:  Controlled   Airway Patency:  Patent      Post Op Vitals Reviewed: Yes      Staff: Anesthesiologist, CRNA         No notable events documented.     BP      Temp      Pulse     Resp      SpO2      /83   Pulse 62   Temp 98.4 °F (36.9 °C) (Temporal)   Resp 18   Ht 5' 9" (1.753 m)   Wt 94.3 kg (208 lb)   SpO2 100%   BMI 30.72 kg/m²

## 2023-10-24 NOTE — INTERVAL H&P NOTE
H&P reviewed. After examining the patient I find no changes in the patients condition since the H&P had been written.     Vitals:    10/24/23 0701   BP: 162/91   Pulse: 79   Resp: 16   Temp: 98.4 °F (36.9 °C)   SpO2: 99%

## 2023-10-24 NOTE — ANESTHESIA PREPROCEDURE EVALUATION
Procedure:  COLONOSCOPY    Relevant Problems   CARDIO   (+) Primary hypertension      GI/HEPATIC   (+) GERD (gastroesophageal reflux disease)      MUSCULOSKELETAL   (+) Chronic low back pain      NEURO/PSYCH   (+) Chronic low back pain        Physical Exam    Airway    Mallampati score: II  TM Distance: >3 FB  Neck ROM: full     Dental   No notable dental hx upper dentures    Cardiovascular  Rhythm: regular, Rate: normal, Cardiovascular exam normal    Pulmonary  Pulmonary exam normal Breath sounds clear to auscultation    Other Findings        Anesthesia Plan  ASA Score- 2     Anesthesia Type- IV sedation with anesthesia with ASA Monitors. Additional Monitors:     Airway Plan:     Comment: GA prn. Plan Factors-    Chart reviewed. Patient summary reviewed. Patient is not a current smoker. Patient instructed to abstain from smoking on day of procedure. Patient did not smoke on day of surgery. Induction- intravenous. Postoperative Plan-     Informed Consent- Anesthetic plan and risks discussed with patient.

## 2023-10-25 ENCOUNTER — TELEPHONE (OUTPATIENT)
Dept: SURGERY | Facility: CLINIC | Age: 68
End: 2023-10-25

## 2023-11-02 ENCOUNTER — VBI (OUTPATIENT)
Dept: ADMINISTRATIVE | Facility: OTHER | Age: 68
End: 2023-11-02

## 2023-11-02 NOTE — TELEPHONE ENCOUNTER
11/02/23 11:52 AM    Patient contacted (left message) to bring Advance Directive, POLST, or Living Will document to next scheduled pcp visit. Thank you.   Ashley Panchal  [facility]

## 2024-03-20 ENCOUNTER — TELEPHONE (OUTPATIENT)
Dept: ADMINISTRATIVE | Facility: OTHER | Age: 69
End: 2024-03-20

## 2024-03-20 NOTE — TELEPHONE ENCOUNTER
acp03/20/24 4:16 PM    Patient contacted (left message) to bring Advance Directive, POLST, or Living Will document to next scheduled pcp visit.    Thank you.  Aletha Salvador MA  [facility]

## 2024-04-01 ENCOUNTER — TELEPHONE (OUTPATIENT)
Dept: INTERNAL MEDICINE CLINIC | Facility: CLINIC | Age: 69
End: 2024-04-01

## 2024-08-16 ENCOUNTER — TELEPHONE (OUTPATIENT)
Age: 69
End: 2024-08-16

## 2024-08-16 NOTE — TELEPHONE ENCOUNTER
Cindy from [facility] called to check the status of a medication request form they sent on the 12th. I did not see anything in media so I called the office and they did not have anything either. Natasha gave me the in office fax number to give to the [facility] rep.

## 2024-08-21 ENCOUNTER — RA CDI HCC (OUTPATIENT)
Dept: OTHER | Facility: HOSPITAL | Age: 69
End: 2024-08-21

## 2024-08-28 ENCOUNTER — TELEPHONE (OUTPATIENT)
Dept: INTERNAL MEDICINE CLINIC | Facility: CLINIC | Age: 69
End: 2024-08-28

## 2024-08-30 NOTE — TELEPHONE ENCOUNTER
08/29/24 11:45 PM        The office's request has been received, reviewed, and the patient chart updated. The PCP has successfully been removed with a patient attribution note. This message will now be completed.        Thank you  Derrek Marroquin

## 2025-03-14 ENCOUNTER — TELEPHONE (OUTPATIENT)
Dept: SURGERY | Facility: CLINIC | Age: 70
End: 2025-03-14

## 2025-03-14 NOTE — TELEPHONE ENCOUNTER
Larissa from [facility] is calling to request a copy of patient's last Colonoscopy which was done on 10/24/23 by Aimee Jacques be faxed to Anne Gabriel, PCP @ #[phone]